# Patient Record
Sex: FEMALE | Race: WHITE | Employment: FULL TIME | URBAN - METROPOLITAN AREA
[De-identification: names, ages, dates, MRNs, and addresses within clinical notes are randomized per-mention and may not be internally consistent; named-entity substitution may affect disease eponyms.]

---

## 2017-09-06 ENCOUNTER — TELEPHONE (OUTPATIENT)
Dept: INTERNAL MEDICINE CLINIC | Age: 47
End: 2017-09-06

## 2017-09-06 RX ORDER — ESCITALOPRAM OXALATE 5 MG/1
5 TABLET ORAL DAILY
Qty: 30 TAB | Refills: 6 | Status: SHIPPED | OUTPATIENT
Start: 2017-09-06 | End: 2017-11-28 | Stop reason: DRUGHIGH

## 2017-09-06 NOTE — TELEPHONE ENCOUNTER
Trellradha Alka called the office stating [facility] requested a refill of her Lexapro last week, but they have not received confirmation on this. MsSilviano Darrall Kocher would like to be notified when this is called in to her pharmacy. Best number to reach her is [phone].     Recent Visit:       07/06/17  Upcoming Visit:  None scheduled

## 2017-09-07 ENCOUNTER — TELEPHONE (OUTPATIENT)
Dept: INTERNAL MEDICINE CLINIC | Age: 47
End: 2017-09-07

## 2017-09-07 RX ORDER — BUPROPION HYDROCHLORIDE 150 MG/1
150 TABLET, EXTENDED RELEASE ORAL 2 TIMES DAILY
Qty: 60 TAB | Refills: 0 | Status: SHIPPED | OUTPATIENT
Start: 2017-09-07 | End: 2017-09-07 | Stop reason: SDUPTHER

## 2017-09-07 RX ORDER — BUPROPION HYDROCHLORIDE 150 MG/1
150 TABLET, EXTENDED RELEASE ORAL 2 TIMES DAILY
Qty: 180 TAB | Refills: 3 | Status: SHIPPED | OUTPATIENT
Start: 2017-09-07 | End: 2018-10-07 | Stop reason: SDUPTHER

## 2017-09-07 NOTE — TELEPHONE ENCOUNTER
Lane Santiago is requesting a refill of her Wellbutrin 150, 2 x daily, 90 days. Dr. Javy Singh increased this medication from 100 2 x daily. She is requesting a prescription be sent to [email or web address], and she is also requesting a prescription be sent to [facility] on [address] to get her through until her prescription from [address] comes in. Upcoming visit:  None scheduled  Recent visit:       07/06/17    She can be reached at [phone].

## 2017-10-05 ENCOUNTER — OFFICE VISIT (OUTPATIENT)
Dept: INTERNAL MEDICINE CLINIC | Age: 47
End: 2017-10-05

## 2017-10-05 VITALS
OXYGEN SATURATION: 98 % | SYSTOLIC BLOOD PRESSURE: 132 MMHG | DIASTOLIC BLOOD PRESSURE: 90 MMHG | HEART RATE: 84 BPM | TEMPERATURE: 98.2 F | WEIGHT: 236 LBS

## 2017-10-05 DIAGNOSIS — J45.20 MILD INTERMITTENT ASTHMATIC BRONCHITIS WITHOUT COMPLICATION: ICD-10-CM

## 2017-10-05 DIAGNOSIS — R05.8 COUGH WITH EXPECTORATION: Primary | ICD-10-CM

## 2017-10-05 PROBLEM — M79.672 BILATERAL FOOT PAIN: Status: ACTIVE | Noted: 2017-10-05

## 2017-10-05 PROBLEM — J30.9 ALLERGIC RHINITIS: Status: ACTIVE | Noted: 2017-10-05

## 2017-10-05 PROBLEM — E66.01 OBESITIES, MORBID (HCC): Status: ACTIVE | Noted: 2017-10-05

## 2017-10-05 PROBLEM — M25.562 LEFT KNEE PAIN: Status: ACTIVE | Noted: 2017-10-05

## 2017-10-05 PROBLEM — I73.00 RAYNAUD'S DISEASE: Status: ACTIVE | Noted: 2017-10-05

## 2017-10-05 PROBLEM — Z13.1 SCREENING FOR DIABETES MELLITUS: Status: ACTIVE | Noted: 2017-10-05

## 2017-10-05 PROBLEM — M17.9 DJD (DEGENERATIVE JOINT DISEASE) OF KNEE: Status: ACTIVE | Noted: 2017-10-05

## 2017-10-05 PROBLEM — E55.9 VITAMIN D DEFICIENCY: Status: ACTIVE | Noted: 2017-10-05

## 2017-10-05 PROBLEM — F32.A DEPRESSION: Status: ACTIVE | Noted: 2017-10-05

## 2017-10-05 PROBLEM — Z13.220 SCREENING FOR HYPERLIPIDEMIA: Status: ACTIVE | Noted: 2017-10-05

## 2017-10-05 PROBLEM — M79.671 BILATERAL FOOT PAIN: Status: ACTIVE | Noted: 2017-10-05

## 2017-10-05 PROBLEM — M72.2 PLANTAR FASCIITIS, BILATERAL: Status: ACTIVE | Noted: 2017-10-05

## 2017-10-05 PROBLEM — Z13.29 SCREENING FOR HYPOTHYROIDISM: Status: ACTIVE | Noted: 2017-10-05

## 2017-10-05 PROBLEM — Z00.00 ANNUAL PHYSICAL EXAM: Status: ACTIVE | Noted: 2017-10-05

## 2017-10-05 RX ORDER — FEXOFENADINE HCL AND PSEUDOEPHEDRINE HCI 180; 240 MG/1; MG/1
1 TABLET, EXTENDED RELEASE ORAL DAILY
COMMUNITY
End: 2018-10-18 | Stop reason: ALTCHOICE

## 2017-10-05 RX ORDER — BISMUTH SUBSALICYLATE 262 MG
1 TABLET,CHEWABLE ORAL DAILY
COMMUNITY
End: 2022-03-15

## 2017-10-05 RX ORDER — METHYLPREDNISOLONE 4 MG/1
4 TABLET ORAL
Qty: 1 DOSE PACK | Refills: 0 | Status: SHIPPED | OUTPATIENT
Start: 2017-10-05 | End: 2017-11-28 | Stop reason: SDUPTHER

## 2017-10-05 RX ORDER — LORATADINE 10 MG/1
10 TABLET ORAL
COMMUNITY
End: 2020-02-19 | Stop reason: ALTCHOICE

## 2017-10-05 RX ORDER — SERTRALINE HYDROCHLORIDE 25 MG/1
TABLET, FILM COATED ORAL DAILY
COMMUNITY
End: 2018-10-18 | Stop reason: ALTCHOICE

## 2017-10-05 RX ORDER — CEFUROXIME AXETIL 500 MG/1
500 TABLET ORAL 2 TIMES DAILY
Qty: 20 TAB | Refills: 0 | Status: SHIPPED | OUTPATIENT
Start: 2017-10-05 | End: 2017-11-28 | Stop reason: SDUPTHER

## 2017-10-05 RX ORDER — FLUTICASONE PROPIONATE 50 MCG
2 SPRAY, SUSPENSION (ML) NASAL DAILY
COMMUNITY

## 2017-10-05 NOTE — PROGRESS NOTES
Subjective:  Ms. Honorio Bravo is a pleasant 56year old lady who comes in today with almost a three week history of a productive cough of yellowish sputum, some shortness of breath and wheezing. All of her difficulties started around 09/15/17. She was seen at [facility] and treated with a steroid pack and given a nebulizer, as well as some Ventolin, which seemed to help initially. It was felt that it was more of an allergic reaction than infection. She denies any past history of asthma, but she does have allergies to cats. However in the last week she has had increasing wheezing and is now coughing up yellowish sputum. She denies any chills or fever. She denies any hemoptysis. She denies any nausea or vomiting. She has been using the Ventolin inhaler three or four times a day. Physical Examination:  GENERAL:  On exam she is a pleasant lady in no acute distress. She is alert and oriented. VITALS:  BP: 132/90. P: 84.  T: 98.2. O2 sat: 98%. HEENT:  Normocephalic, atraumatic. TMs normal.  Mouth mucosa pink. Tongue midline. Pharynx minimally injected without presence of exudates. No sinus tenderness. NECK:  Supple without adenopathy. CHEST:  Lungs - occasional expiratory wheezes throughout both lung fields. No rales. Good chest excursion. CARDIAC:  Heart regular rhythm without murmur or gallop. EXTREMITIES:  No edema or calf tenderness. Distal pulses were present. Studies:  Two views of the chest failed to reveal any pneumonia. Impression:  1. Asthmatic bronchitis. Plan:  1. It was opted to start her on Ceftin 500 mg twice daily for ten days, along with a Medrol Dosepak. I also started her on Symbicort 160/4.5  two inhalations in the morning and two at bedtime. I instructed her on proper use of the inhaler. She may continue to use the Ventolin as needed. She will contact us should she fail to improve or if her condition worsens.   She is to increase fluids and rest.

## 2017-11-22 ENCOUNTER — TELEPHONE (OUTPATIENT)
Dept: INTERNAL MEDICINE CLINIC | Age: 47
End: 2017-11-22

## 2017-11-22 NOTE — TELEPHONE ENCOUNTER
Sofia López phoned the office stating that Dr. Janet Sherman increased her Lexapro from 5 mg to 10 mg and is needing a new prescription for the new strength. (Notified patient we would need office notes from Dr. Janet Sherman). Also, Dr. Janet Sherman gave her samples of Symbicort as she is sick again and coughing up phlegm. She is requesting a prescription for Symbicort as well. Please send both to [facility] on [address].     Upcoming visit:  None  Recent visit:       10/05/2017

## 2017-11-28 ENCOUNTER — OFFICE VISIT (OUTPATIENT)
Dept: INTERNAL MEDICINE CLINIC | Age: 47
End: 2017-11-28

## 2017-11-28 VITALS
DIASTOLIC BLOOD PRESSURE: 89 MMHG | WEIGHT: 248 LBS | OXYGEN SATURATION: 98 % | SYSTOLIC BLOOD PRESSURE: 137 MMHG | TEMPERATURE: 98.7 F | HEART RATE: 81 BPM

## 2017-11-28 DIAGNOSIS — F32.A DEPRESSION, UNSPECIFIED DEPRESSION TYPE: ICD-10-CM

## 2017-11-28 DIAGNOSIS — J45.20 MILD INTERMITTENT ASTHMATIC BRONCHITIS WITHOUT COMPLICATION: Primary | ICD-10-CM

## 2017-11-28 RX ORDER — CEFUROXIME AXETIL 500 MG/1
500 TABLET ORAL 2 TIMES DAILY
Qty: 20 TAB | Refills: 0 | Status: SHIPPED | OUTPATIENT
Start: 2017-11-28 | End: 2018-10-18 | Stop reason: ALTCHOICE

## 2017-11-28 RX ORDER — ESCITALOPRAM OXALATE 10 MG/1
10 TABLET ORAL DAILY
Qty: 90 TAB | Refills: 3 | Status: SHIPPED | OUTPATIENT
Start: 2017-11-28 | End: 2018-10-18 | Stop reason: SDUPTHER

## 2017-11-28 RX ORDER — METHYLPREDNISOLONE 4 MG/1
4 TABLET ORAL
Qty: 1 DOSE PACK | Refills: 0 | Status: SHIPPED | OUTPATIENT
Start: 2017-11-28 | End: 2018-10-18 | Stop reason: ALTCHOICE

## 2017-11-28 RX ORDER — BUDESONIDE AND FORMOTEROL FUMARATE DIHYDRATE 160; 4.5 UG/1; UG/1
2 AEROSOL RESPIRATORY (INHALATION) 2 TIMES DAILY
Qty: 1 INHALER | Refills: 12 | Status: SHIPPED | OUTPATIENT
Start: 2017-11-28 | End: 2018-10-18 | Stop reason: SDUPTHER

## 2017-11-28 NOTE — PROGRESS NOTES
Nela Del Valle presents with   Chief Complaint   Patient presents with    Cough    Ear Fullness    Chills    Sore Throat   Patient here with complaint of unresolved cough. Also now with ear fullness, sore throat & chills. Was last seen in early October & given Ceftin, Medrol dose margarita & Symbicort. States she felt somewhat better until she finished the Symbicort and symptoms returned. 1. Have you been to the ER, urgent care clinic since your last visit? Hospitalized since your last visit? No    2. Have you seen or consulted any other health care providers outside of the [address] [place] [address] since your last visit? Include any pap smears or colon screening.  No

## 2017-11-28 NOTE — PROGRESS NOTES
Subjective:  Ms. Barb Tobias is a pleasant 56year old lady, who comes in today for evaluation of productive cough of yellowish sputum associated with some wheezing. I saw her about six weeks ago, at which time I treated her for an asthmatic bronchitis with a course of Ceftin, Medrol Dosepak, and started her on Symbicort. She did extremely well on the Symbicort. She has been off of it now for two weeks and her symptoms have returned. She denies any shortness of breath or hemoptysis. She denies any chills or fever. She denies any nausea or vomiting. In addition she wanted to remind me that when she last saw Dr. Rancho Cox, her psychologist, her Lexapro was increased to 10 mg daily. This has helped her tremendously. She is in need of a new rx. Physical Examination:  GENERAL:  On exam she is a pleasant lady in no acute distress. She is alert and oriented. VITALS:  BP: 137/89. P: 81.  T: 98.7. O2 sat: 98%. HEENT:  Normocephalic, atraumatic. Left TM is minimally injected. Right TM is normal.  Mouth mucosa pink. Tongue midline. Pharynx normal. No sinus tenderness. NECK:  Supple without adenopathy. CHEST:  Lungs were clear to auscultation other than occasional expiratory wheeze. There are no rales. Good chest excursion. CARDIAC:  Heart regular rhythm without murmur. EXTREMITIES:  No edema or calf tenderness. Distal pulses were present. Impression:  1. Recurrent asthmatic bronchitis. Plan:  1. It was opted to start her on Ceftin 500 mg twice daily for seven days, along with a Medrol Dosepak. 2. I did restart her on Symbicort 160/4.5, two inhalations twice daily. We did discuss the fact that she should stay on it indefinitely. 3. In addition she may continue with Flonase alternating with Zyrtec or Claritin. 4. She is to increase fluids and rest.  5. She will contact us should she fail to improve or if her condition worsens.   6. I did send Lexapro 10 mg, (#90) and three refills to [facility].

## 2017-11-28 NOTE — MR AVS SNAPSHOT
Visit Information Date & Time Provider Department Dept. Phone Encounter #  
 11/28/2017 11:00 AM Joel Thomas NP [address] [place] [address] [facility] [phone] [id number] Follow-up Instructions Return if symptoms worsen or fail to improve. Upcoming Health Maintenance Date Due Pneumococcal 19-64 Medium Risk (1 of 1 - PPSV23) 6/4/1990 DTaP/Tdap/Td series (1 - Tdap) 6/4/1992 PAP AKA CERVICAL CYTOLOGY 6/4/1992 Influenza Age 5 to Adult 8/1/2017 Allergies as of 11/28/2017  Review Complete On: 11/28/2017 By: Joel Thomas NP No Known Allergies Current Immunizations  Never Reviewed No immunizations on file. Not reviewed this visit You Were Diagnosed With   
  
 Codes Comments Mild intermittent asthmatic bronchitis without complication    -  Primary ICD-10-CM: J45.20 ICD-9-CM: 493.90 Depression, unspecified depression type     ICD-10-CM: F32.9 ICD-9-CM: 666 Vitals BP Pulse Temp Weight(growth percentile) SpO2 Smoking Status 137/89 (BP 1 Location: Left arm, BP Patient Position: Sitting) 81 98.7 °F (37.1 °C) (Oral) 248 lb (112.5 kg) 98% Never Smoker Preferred Pharmacy Pharmacy Name Phone [address] [facility] [phone] Your Updated Medication List  
  
   
This list is accurate as of: 11/28/17 11:48 AM.  Always use your most recent med list. ALLEGRA-D 24 HOUR 180-240 mg per tablet Generic drug:  fexofenadine-pseudoephedrine Take 1 Tab by mouth daily. budesonide-formoterol 160-4.5 mcg/actuation Hfaa Commonly known as:  SYMBICORT Take 2 Puffs by inhalation two (2) times a day. buPROPion  mg SR tablet Commonly known as:  Fontana Civil Take 1 Tab by mouth two (2) times a day. cefUROXime 500 mg tablet Commonly known as:  CEFTIN Take 1 Tab by mouth two (2) times a day. CLARITIN 10 mg tablet Generic drug:  loratadine Take 10 mg by mouth.  
  
 escitalopram oxalate 10 mg tablet Commonly known as:  Zenaida Winslow Take 1 Tab by mouth daily. FLONASE 50 mcg/actuation nasal spray Generic drug:  fluticasone 2 Sprays by Both Nostrils route daily. methylPREDNISolone 4 mg tablet Commonly known as:  Karle Silverdale Take 1 Tab by mouth Specific Days and Specific Times. multivitamin tablet Commonly known as:  ONE A DAY Take 1 Tab by mouth daily. sertraline 25 mg tablet Commonly known as:  ZOLOFT Take  by mouth daily. Prescriptions Sent to Pharmacy Refills  
 cefUROXime (CEFTIN) 500 mg tablet 0 Sig: Take 1 Tab by mouth two (2) times a day. Class: Normal  
 Pharmacy: [address] Ph #: [phone] Route: Oral  
 methylPREDNISolone (MEDROL DOSEPACK) 4 mg tablet 0 Sig: Take 1 Tab by mouth Specific Days and Specific Times. Class: Normal  
 Pharmacy: [address] Ph #: [phone] Route: Oral  
 budesonide-formoterol (SYMBICORT) 160-4.5 mcg/actuation HFAA 12 Sig: Take 2 Puffs by inhalation two (2) times a day. Class: Normal  
 Pharmacy: [address] Ph #: [phone] Route: Inhalation  
 escitalopram oxalate (LEXAPRO) 10 mg tablet 3 Sig: Take 1 Tab by mouth daily. Class: Normal  
 Pharmacy: [address], [address] [place] [address] Ph #: [phone] Route: Oral  
  
Follow-up Instructions Return if symptoms worsen or fail to improve. Patient Instructions Asthma in Adults: Care Instructions Your Care Instructions During an asthma attack, your airways swell and narrow as a reaction to certain things (triggers). This makes it hard to breathe. You may be able to prevent asthma attacks if you avoid the things that set off your asthma symptoms. Keeping your asthma under control and treating symptoms before they get bad can help you avoid severe attacks. If you can control your asthma, you may be able to do all of your normal daily activities. You may also avoid asthma attacks and trips to the hospital. 
Follow-up care is a key part of your treatment and safety. Be sure to make and go to all appointments, and call your doctor if you are having problems. It's also a good idea to know your test results and keep a list of the medicines you take. How can you care for yourself at home? · Follow your asthma action plan so you can manage your symptoms at home. An asthma action plan will help you prevent and control airway reactions and will tell you what to do during an asthma attack. If you do not have an asthma action plan, work with your doctor to build one. · Take your asthma medicine exactly as prescribed. Medicine plays an important role in controlling asthma. Talk to your doctor right away if you have any questions about what to take and how to take it. ¨ Use your quick-relief medicine when you have symptoms of an attack. Quick-relief medicine often is an albuterol inhaler. Some people need to use quick-relief medicine before they exercise. ¨ Take your controller medicine every day, not just when you have symptoms. Controller medicine is usually an inhaled corticosteroid. The goal is to prevent problems before they occur. Do not use your controller medicine to try to treat an attack that has already started. It does not work fast enough to help. ¨ If your doctor prescribed corticosteroid pills to use during an attack, take them as directed.  They may take hours to work, but they may shorten the attack and help you breathe better. ¨ Keep your quick-relief medicine with you at all times. · Talk to your doctor before using other medicines. Some medicines, such as aspirin, can cause asthma attacks in some people. · Check yourself for asthma symptoms to know which step to follow in your action plan. Watch for things like being short of breath, having chest tightness, coughing, and wheezing. Also notice if symptoms wake you up at night or if you get tired quickly when you exercise. · If you have a peak flow meter, use it to check how well you are breathing. This can help you predict when an asthma attack is going to occur. Then you can take medicine to prevent the asthma attack or make it less severe. · See your doctor regularly. These visits will help you learn more about asthma and what you can do to control it. Your doctor will monitor your treatment to make sure the medicine is helping you. · Keep track of your asthma attacks and your treatment. After you have had an attack, write down what triggered it, what helped end it, and any concerns you have about your asthma action plan. Take your diary when you see your doctor. You can then review your asthma action plan and decide if it is working. · Do not smoke or allow others to smoke around you. Avoid smoky places. Smoking makes asthma worse. If you need help quitting, talk to your doctor about stop-smoking programs and medicines. These can increase your chances of quitting for good. · Learn what triggers an asthma attack for you, and avoid the triggers when you can. Common triggers include colds, smoke, air pollution, dust, pollen, mold, pets, cockroaches, stress, and cold air. · Avoid colds and the flu. Get a pneumococcal vaccine shot. If you have had one before, ask your doctor whether you need a second dose. Get a flu vaccine every fall. If you must be around people with colds or the flu, wash your hands often. When should you call for help? Call 911 anytime you think you may need emergency care. For example, call if: 
? · You have severe trouble breathing. ?Call your doctor now or seek immediate medical care if: 
? · Your symptoms do not get better after you have followed your asthma action plan. ? · You cough up yellow, dark brown, or bloody mucus (sputum). ? Watch closely for changes in your health, and be sure to contact your doctor if: 
? · Your coughing and wheezing get worse. ? · You need to use quick-relief medicine on more than 2 days a week (unless it is just for exercise). ? · You need help figuring out what is triggering your asthma attacks. Where can you learn more? Go to http://gaetano-delon.info/. Enter P597 in the search box to learn more about \"Asthma in Adults: Care Instructions. \" Current as of: May 12, 2017 Content Version: 11.4 © 3568-6852 [employer]. Care instructions adapted under license by [employer] (which disclaims liability or warranty for this information). If you have questions about a medical condition or this instruction, always ask your healthcare professional. [place] [address] any warranty or liability for your use of this information. Introducing [facility]! [address] introduces quietrevolution patient portal. Now you can access parts of your medical record, email your doctor's office, and request medication refills online. 1. In your internet browser, go to [email or web address] 2. Click on the First Time User? Click Here link in the Sign In box. You will see the New Member Sign Up page. 3. Enter your quietrevolution Access Code exactly as it appears below. You will not need to use this code after youve completed the sign-up process. If you do not sign up before the expiration date, you must request a new code. · quietrevolution Access Code: [email or web address] Expires: 1/3/2018  1:05 PM 
 
 4. Enter the last four digits of your Social Security Number (xxxx) and Date of Birth (mm/dd/yyyy) as indicated and click Submit. You will be taken to the next sign-up page. 5. Create a Boomi ID. This will be your Boomi login ID and cannot be changed, so think of one that is secure and easy to remember. 6. Create a Boomi password. You can change your password at any time. 7. Enter your Password Reset Question and Answer. This can be used at a later time if you forget your password. 8. Enter your e-mail address. You will receive e-mail notification when new information is available in [address] 9. Click Sign Up. You can now view and download portions of your medical record. 10. Click the Download Summary menu link to download a portable copy of your medical information. If you have questions, please visit the Frequently Asked Questions section of the Boomi website. Remember, Boomi is NOT to be used for urgent needs. For medical emergencies, dial 911. Now available from your iPhone and Android! Please provide this summary of care documentation to your next provider. Your primary care clinician is listed as Mendoza Barnes. If you have any questions after today's visit, please call [phone].

## 2017-11-28 NOTE — PATIENT INSTRUCTIONS

## 2017-11-29 NOTE — TELEPHONE ENCOUNTER
Symbicort 160-4.5 mcg to take 2 puffs 2 x daily was called in to [facility] for [facility] on 11/28/2017.   Receipt confirmed by pharmacy 11/28/2017 at 11:39 AM.

## 2018-10-18 ENCOUNTER — OFFICE VISIT (OUTPATIENT)
Dept: INTERNAL MEDICINE CLINIC | Age: 48
End: 2018-10-18

## 2018-10-18 VITALS
SYSTOLIC BLOOD PRESSURE: 123 MMHG | OXYGEN SATURATION: 98 % | DIASTOLIC BLOOD PRESSURE: 89 MMHG | HEIGHT: 69 IN | HEART RATE: 83 BPM | BODY MASS INDEX: 40.58 KG/M2 | WEIGHT: 274 LBS

## 2018-10-18 DIAGNOSIS — E66.01 CLASS 3 SEVERE OBESITY DUE TO EXCESS CALORIES WITHOUT SERIOUS COMORBIDITY WITH BODY MASS INDEX (BMI) OF 40.0 TO 44.9 IN ADULT (HCC): ICD-10-CM

## 2018-10-18 DIAGNOSIS — F32.A DEPRESSION, UNSPECIFIED DEPRESSION TYPE: Primary | ICD-10-CM

## 2018-10-18 RX ORDER — ESCITALOPRAM OXALATE 10 MG/1
10 TABLET ORAL DAILY
Qty: 90 TAB | Refills: 3 | Status: SHIPPED | OUTPATIENT
Start: 2018-10-18 | End: 2019-12-10 | Stop reason: SDUPTHER

## 2018-10-18 RX ORDER — BUPROPION HYDROCHLORIDE 150 MG/1
150 TABLET, EXTENDED RELEASE ORAL 2 TIMES DAILY
Qty: 180 TAB | Refills: 2 | Status: SHIPPED | OUTPATIENT
Start: 2018-10-18 | End: 2019-09-01 | Stop reason: SDUPTHER

## 2018-10-18 RX ORDER — BUDESONIDE AND FORMOTEROL FUMARATE DIHYDRATE 160; 4.5 UG/1; UG/1
2 AEROSOL RESPIRATORY (INHALATION) 2 TIMES DAILY
Qty: 1 INHALER | Refills: 12 | Status: SHIPPED | OUTPATIENT
Start: 2018-10-18 | End: 2019-01-31 | Stop reason: SDUPTHER

## 2018-10-18 RX ORDER — DROSPIRENONE AND ETHINYL ESTRADIOL 0.03MG-3MG
KIT ORAL DAILY
COMMUNITY
End: 2022-03-15

## 2018-10-18 NOTE — PROGRESS NOTES
Subjective:  Ms. Jannet Hargrove is a pleasant 53year old lady per my request.  She was last seen here in November of 2017. She recently called for a refill on her medication and I did ask her to come in to be seen. She really does not have any complaints. She is currently being treated for chronic depression. She is in counseling and is doing very well. She does not have any new complaints today. Specifically she denies headaches, dizziness or blurred vision. Denies chest pain or palpitations. Denies shortness of breath, cough, wheezing, PND or orthopnea. Denies ankle edema. Physical Examination:  GENERAL:  Pleasant lady in no acute distress. She is alert and oriented. VITALS:  BP: 123/89. P: 83.  O2 sat: 98.  WT: 274, which is up since her last visit. HEENT:  Normocephalic, atraumatic. NECK:  Supple without adenopathy. CHEST:  Lungs clear to auscultation, no rales or wheezes. CV:  Heart regular rhythm without murmur. EXTREMITIES:  No edema or calf tenderness. Distal pulses were present. Impression:  1. Chronic depression. 2. Marked obesity. Plan:  1. She will continue with her current regimen and I will have her come in in the next few weeks for an updated H&P and fasting lab studies. She is in agreement.

## 2018-10-18 NOTE — PROGRESS NOTES
Addendum:  While here today I did talk to Ms. Johnson Speaker about weight, which has gone up in the last year. We discussed the importance of a balanced diet, exercise and weight loss to keep her BMI at an acceptable level.

## 2018-10-18 NOTE — PROGRESS NOTES
Past Medical History:   Diagnosis Date    Allergic rhinitis 10/5/2017    Annual physical exam 10/5/2017    Bilateral foot pain 10/5/2017    Depression 10/5/2017    DJD (degenerative joint disease) of knee 10/5/2017    Left knee pain 10/5/2017    Obesities, morbid ([address] 10/5/2017    Plantar fasciitis, bilateral 10/5/2017    Raynaud's disease 10/5/2017    Screening for diabetes mellitus 10/5/2017    Screening for hyperlipidemia 10/5/2017    Screening for hypothyroidism 10/5/2017    Vitamin D deficiency 10/5/2017     History reviewed. No pertinent surgical history. Current Outpatient Medications on File Prior to Visit   Medication Sig Dispense Refill    drospirenone-ethinyl estradiol (BRI, 28,) 3-0.03 mg tab Take  by mouth daily.  multivitamin (ONE A DAY) tablet Take 1 Tab by mouth daily.  fluticasone (FLONASE) 50 mcg/actuation nasal spray 2 Sprays by Both Nostrils route daily.  loratadine (CLARITIN) 10 mg tablet Take 10 mg by mouth. No current facility-administered medications on file prior to visit.       No Known Allergies

## 2018-10-18 NOTE — PROGRESS NOTES
Lyndsey Adams presents with   Chief Complaint   Patient presents with    Medication Refill    Follow-up   Patient here for an overdue followup and refills on all of her medications. Last seen 11/2017. Weight is up 26lbs. 1. Have you been to the ER, urgent care clinic since your last visit? Hospitalized since your last visit? No    2. Have you seen or consulted any other health care providers outside of the [address] [place] [address] since your last visit? Include any pap smears or colon screening.  No

## 2019-01-31 ENCOUNTER — TELEPHONE (OUTPATIENT)
Dept: INTERNAL MEDICINE CLINIC | Age: 49
End: 2019-01-31

## 2019-01-31 RX ORDER — BUDESONIDE AND FORMOTEROL FUMARATE DIHYDRATE 160; 4.5 UG/1; UG/1
2 AEROSOL RESPIRATORY (INHALATION) 2 TIMES DAILY
Qty: 3 INHALER | Refills: 3 | Status: SHIPPED | OUTPATIENT
Start: 2019-01-31 | End: 2020-02-03 | Stop reason: SDUPTHER

## 2019-01-31 NOTE — TELEPHONE ENCOUNTER
Patient called requesting a new prescription for   Symbicort 160-4.5 mcg, 2 puffs 2x daily, [place] [address] [place]. She is unable to use the discount card thru [facility].

## 2019-02-14 ENCOUNTER — OFFICE VISIT (OUTPATIENT)
Dept: INTERNAL MEDICINE CLINIC | Age: 49
End: 2019-02-14

## 2019-02-14 VITALS
OXYGEN SATURATION: 96 % | HEART RATE: 80 BPM | TEMPERATURE: 98.2 F | DIASTOLIC BLOOD PRESSURE: 85 MMHG | SYSTOLIC BLOOD PRESSURE: 125 MMHG | WEIGHT: 269 LBS | HEIGHT: 68 IN | BODY MASS INDEX: 40.77 KG/M2

## 2019-02-14 DIAGNOSIS — M54.41 CHRONIC BILATERAL LOW BACK PAIN WITH BILATERAL SCIATICA: ICD-10-CM

## 2019-02-14 DIAGNOSIS — Z00.00 ANNUAL PHYSICAL EXAM: Primary | ICD-10-CM

## 2019-02-14 DIAGNOSIS — E66.01 OBESITIES, MORBID (HCC): ICD-10-CM

## 2019-02-14 DIAGNOSIS — E55.9 VITAMIN D DEFICIENCY: ICD-10-CM

## 2019-02-14 DIAGNOSIS — M54.42 CHRONIC BILATERAL LOW BACK PAIN WITH BILATERAL SCIATICA: ICD-10-CM

## 2019-02-14 DIAGNOSIS — Z13.1 SCREENING FOR DIABETES MELLITUS: ICD-10-CM

## 2019-02-14 DIAGNOSIS — G89.29 CHRONIC BILATERAL LOW BACK PAIN WITH BILATERAL SCIATICA: ICD-10-CM

## 2019-02-14 DIAGNOSIS — Z13.29 SCREENING FOR HYPOTHYROIDISM: ICD-10-CM

## 2019-02-14 DIAGNOSIS — Z13.220 SCREENING FOR HYPERLIPIDEMIA: ICD-10-CM

## 2019-02-14 LAB
BILIRUB UR QL: NEGATIVE
CLARITY: CLEAR
COLOR UR: ABNORMAL
ERYTHROCYTE [DISTWIDTH] IN BLOOD BY AUTOMATED COUNT: 15.2 %
GLUCOSE 24H UR-MRATE: NEGATIVE G/(24.H)
HCT VFR BLD AUTO: 41.8 % (ref 37–51)
HGB BLD-MCNC: 13.3 G/DL (ref 12–18)
HGB UR QL STRIP: NEGATIVE
KETONES UR QL STRIP.AUTO: ABNORMAL
LEUKOCYTE ESTERASE: NEGATIVE
LYMPHOCYTES ABSOLUTE: 2.5 K/UL (ref 0.6–4.1)
LYMPHOCYTES NFR BLD: 28.2 % (ref 10–58.5)
MCH RBC QN AUTO: 28.1 PG (ref 26–32)
MCHC RBC AUTO-ENTMCNC: 31.8 G/DL (ref 30–36)
MCV RBC AUTO: 88.1 FL (ref 80–97)
MONOCYTES ABS-DIF,2141: 0.8 K/UL (ref 0–1.8)
MONOCYTES NFR BLD: 9.3 % (ref 0.1–24)
NEUTROPHILS # BLD: 62.5 % (ref 37–92)
NEUTROPHILS ABS,2156: 5.5 K/UL (ref 2–7.8)
NITRITE UR QL STRIP.AUTO: NEGATIVE
PH UR STRIP: 7 [PH] (ref 5–7)
PLATELET # BLD AUTO: 286 K/UL (ref 140–440)
PMV BLD AUTO: 9.2 FL
PROT UR STRIP-MCNC: NEGATIVE MG/DL
RBC # BLD AUTO: 4.74 M/UL (ref 4.2–6.3)
SP GR UR REFRACTOMETRY: 1.01 (ref 1–1.03)
UROBILINOGEN UR QL STRIP.AUTO: NEGATIVE
WBC # BLD AUTO: 8.8 K/UL (ref 4.1–10.9)

## 2019-02-14 RX ORDER — PREDNISONE 10 MG/1
TABLET ORAL
Qty: 21 TAB | Refills: 0 | Status: SHIPPED | OUTPATIENT
Start: 2019-02-14 | End: 2020-02-19 | Stop reason: ALTCHOICE

## 2019-02-14 NOTE — PATIENT INSTRUCTIONS
Body Mass Index: Care Instructions  Your Care Instructions    Body mass index (BMI) can help you see if your weight is raising your risk for health problems. It uses a formula to compare how much you weigh with how tall you are. · A BMI lower than 18.5 is considered underweight. · A BMI between 18.5 and 24.9 is considered healthy. · A BMI between 25 and 29.9 is considered overweight. A BMI of 30 or higher is considered obese. If your BMI is in the normal range, it means that you have a lower risk for weight-related health problems. If your BMI is in the overweight or obese range, you may be at increased risk for weight-related health problems, such as high blood pressure, heart disease, stroke, arthritis or joint pain, and diabetes. If your BMI is in the underweight range, you may be at increased risk for health problems such as fatigue, lower protection (immunity) against illness, muscle loss, bone loss, hair loss, and hormone problems. BMI is just one measure of your risk for weight-related health problems. You may be at higher risk for health problems if you are not active, you eat an unhealthy diet, or you drink too much alcohol or use tobacco products. Follow-up care is a key part of your treatment and safety. Be sure to make and go to all appointments, and call your doctor if you are having problems. It's also a good idea to know your test results and keep a list of the medicines you take. How can you care for yourself at home? · Practice healthy eating habits. This includes eating plenty of fruits, vegetables, whole grains, lean protein, and low-fat dairy. · If your doctor recommends it, get more exercise. Walking is a good choice. Bit by bit, increase the amount you walk every day. Try for at least 30 minutes on most days of the week. · Do not smoke. Smoking can increase your risk for health problems. If you need help quitting, talk to your doctor about stop-smoking programs and medicines. These can increase your chances of quitting for good. · Limit alcohol to 2 drinks a day for men and 1 drink a day for women. Too much alcohol can cause health problems. If you have a BMI higher than 25  · Your doctor may do other tests to check your risk for weight-related health problems. This may include measuring the distance around your waist. A waist measurement of more than 40 inches in men or 35 inches in women can increase the risk of weight-related health problems. · Talk with your doctor about steps you can take to stay healthy or improve your health. You may need to make lifestyle changes to lose weight and stay healthy, such as changing your diet and getting regular exercise. If you have a BMI lower than 18.5  · Your doctor may do other tests to check your risk for health problems. · Talk with your doctor about steps you can take to stay healthy or improve your health. You may need to make lifestyle changes to gain or maintain weight and stay healthy, such as getting more healthy foods in your diet and doing exercises to build muscle. Where can you learn more? Go to http://gaetano-delon.info/. Enter S176 in the search box to learn more about \"Body Mass Index: Care Instructions. \"  Current as of: June 25, 2018  Content Version: 11.9  © 3194-9324 [employer]lick[employer]. Care instructions adapted under license by [employer] (which disclaims liability or warranty for this information). If you have questions about a medical condition or this instruction, always ask your healthcare professional. [address] any warranty or liability for your use of this information.

## 2019-02-14 NOTE — PROGRESS NOTES
Viridiana Couch presents with   Chief Complaint   Patient presents with    Complete Physical   Patient here for a CPE. She is fasting. NKDA. Medications reviewed & updated. Medical, surgical, social & family history reviewed & updated. Has Mammogram & Pap/pelvis at [facility]. Colonoscopy done per [facility]. 1. Have you been to the ER, urgent care clinic since your last visit? Hospitalized since your last visit? No    2. Have you seen or consulted any other health care providers outside of the [address] [place] [address] since your last visit? Include any pap smears or colon screening.  No

## 2019-02-14 NOTE — PROGRESS NOTES
Subjective:  Ms. Lawrence Hopkins is a pleasant 53year old lady who comes in today for an updated history and physical.    History of Present Illness:  She does give a several month history of low back pain that radiates down both legs. She denies any injury and is not aware of any precipitating factors. She feels like her muscles are getting tight. She denies any numbness or tingling down the lower extremities. She denies any bowel or bladder difficulty. She has been helped by doing some stretches. Denies any previous history of back injury. Past Medical History:   Diagnosis Date    Allergic rhinitis 10/5/2017    Annual physical exam 10/5/2017    Bilateral foot pain 10/5/2017    Depression 10/5/2017    DJD (degenerative joint disease) of knee 10/5/2017    Left knee pain 10/5/2017    Obesities, morbid ([address] 10/5/2017    Plantar fasciitis, bilateral 10/5/2017    Raynaud's disease 10/5/2017    Screening for diabetes mellitus 10/5/2017    Screening for hyperlipidemia 10/5/2017    Screening for hypothyroidism 10/5/2017    Vitamin D deficiency 10/5/2017     Past Surgical History:   Procedure Laterality Date    HX WISDOM TEETH EXTRACTION      HX WRIST FRACTURE [place]  2005       Current Outpatient Medications on File Prior to Visit   Medication Sig Dispense Refill    budesonide-formoterol (SYMBICORT) 160-4.5 mcg/actuation HFAA Take 2 Puffs by inhalation two (2) times a day. 3 Inhaler 3    drospirenone-ethinyl estradiol (BRI, 28,) 3-0.03 mg tab Take  by mouth daily.  buPROPion SR (WELLBUTRIN SR) 150 mg SR tablet Take 1 Tab by mouth two (2) times a day. 180 Tab 2    escitalopram oxalate (LEXAPRO) 10 mg tablet Take 1 Tab by mouth daily. 90 Tab 3    multivitamin (ONE A DAY) tablet Take 1 Tab by mouth daily.  fluticasone (FLONASE) 50 mcg/actuation nasal spray 2 Sprays by Both Nostrils route daily.  loratadine (CLARITIN) 10 mg tablet Take 10 mg by mouth.        No current facility-administered medications on file prior to visit. Not on File  Past Medical History/Surgeries:    1. ORIF of right hand fracture. 2. LASIK surgery. Illnesses:  1. Chronic depression. 2. Allergic rhinitis. 3. Obesity. Family History:  Father committed suicide at age 67. Mother is 67years of age, alive with hypertension. She has one sister who has had cervical cancer. Social History:  She is . She works for a nonprofit organization. She has four children living and well. Allergies:  None. Medications:  As per [employer]. Habits:  Nonsmoker and non drinker. Review of Systems:  HEENT:  Denies any headaches, dizziness or blurred vision. She does wear glasses. She did have an eye exam done last week, which was normal.  CVR:  Denies any chest pain or palpitations. Denies any syncopal episode, shortness of breath, cough, wheezing, PND or orthopnea. Denies any ankle edema. GI:  Appetite is good, weight has gone back up in the last year. Does have a normal bowel pattern without presence of blood in stools or melena. She did have a colonoscopy two years ago, tells me that she was found to have benign polyps. She is unsure when she needs to have a repeat colonoscopy. :  Denies any urinary symptoms. GYN:  She did have a normal pelvic and pap and mammogram in October, 2018 through the [facility] in [place]. Physical Examination:  GENERAL:  Pleasant lady in no acute distress. She is alert and oriented. She answers my questions appropriately. VITALS:  BP: 125/85. P: 80.  O2 sat: 96.  T: 98.2. WT: 269 lbs. HEENT:  Normocephalic, atraumatic. PERRLA, EOMI. TMs normal.  Mouth mucosa pink. Tongue midline. Pharynx normal.  Teeth are in good repair. NECK:  Supple without adenopathy, thyromegaly or carotid bruits. CHEST:  Lungs clear to auscultation, no rales or wheezes. CV:  Heart regular rhythm without murmur or gallop.   ABDOMEN:  Obese, soft, non tender, no appreciable organomegaly or masses. No lymphadenopathy. EXTREMITIES:  No edema or calf tenderness. Distal pulses were present and symmetrical.  BACK:  No pain on percussion of lumbar spine. She does have pain on hyperextension and lateral bending. SLRL negative bilaterally. She has full ROM of her hips. Sensation is preserved. NEUROLOGIC:  Cranial nerves II-XII intact. Excellent strength in the upper and lower extremities against resistance. Romberg is negative. Reflexes 2+ and symmetrical.  She had excellent coordination. Studies: Three views of the lumbar spine reveal some facet arthropathy, multiple levels, as well as a very mild narrowing at L5-S1. Impression:  1. Low back pain. 2. Degenerative arthritis of lumbar spine. 3. Chronic depression. 4. Allergic rhinitis. 5. Obesity. Plan:  1. In terms of her back it was opted to try her on a Medrol Dosepak. She may use heat alternating with ice. She certainly will contact us should that fail to improve. I certainly could consider referring her to an orthopedic back specialist or physical therapy. 2. She was fasting this morning so it was opted to do all of her lab studies. I will call her as soon as I have her results. 3. We did talk about the importance of a prudent diet, exercise and weight loss to keep her BMI within acceptable level. 4. I will continue to see her yearly.

## 2019-02-15 LAB
A-G RATIO,AGRAT: 1.5 RATIO
ALBUMIN SERPL-MCNC: 4.8 G/DL (ref 3.9–5.4)
ALP SERPL-CCNC: 82 U/L (ref 38–126)
ALT SERPL-CCNC: 36 U/L (ref 9–52)
ANION GAP SERPL CALC-SCNC: 15 MMOL/L
AST SERPL W P-5'-P-CCNC: 25 U/L (ref 14–36)
BILIRUB SERPL-MCNC: 0.2 MG/DL (ref 0.2–1.3)
BUN SERPL-MCNC: 14 MG/DL (ref 7–17)
BUN/CREATININE RATIO,BUCR: 18 RATIO
CALCIUM SERPL-MCNC: 10.3 MG/DL (ref 8.4–10.2)
CHLORIDE SERPL-SCNC: 108 MMOL/L (ref 98–107)
CHOL/HDL RATIO,CHHD: 2 RATIO (ref 0–4)
CHOLEST SERPL-MCNC: 149 MG/DL (ref 0–200)
CO2 SERPL-SCNC: 22 MMOL/L (ref 22–32)
CREAT SERPL-MCNC: 0.8 MG/DL (ref 0.7–1.2)
GLOBULIN,GLOB: 3.3
GLUCOSE SERPL-MCNC: 95 MG/DL (ref 65–105)
HBA1C MFR BLD: 5.6 % (ref 4.8–5.6)
HDLC SERPL-MCNC: 64 MG/DL (ref 35–130)
LDL/HDL RATIO,LDHD: 1 RATIO
LDLC SERPL CALC-MCNC: 60 MG/DL (ref 0–130)
POTASSIUM SERPL-SCNC: 4.6 MMOL/L (ref 3.6–5)
PROT SERPL-MCNC: 8.1 G/DL (ref 6.3–8.2)
SODIUM SERPL-SCNC: 145 MMOL/L (ref 137–145)
TRIGL SERPL-MCNC: 124 MG/DL (ref 0–200)
VLDLC SERPL CALC-MCNC: 25 MG/DL

## 2019-02-18 LAB
25(OH)D3 SERPL-MCNC: 59 NG/ML (ref 30–96)
T4 FREE SERPL-MCNC: 1.02 NG/DL (ref 0.58–2.3)
TSH SERPL DL<=0.05 MIU/L-ACNC: 1.27 UIU/ML (ref 0.34–5.6)

## 2019-02-20 NOTE — PROGRESS NOTES
Lab results discussed with patient. Continue current regimen. Follow up one year. Back is also feeling better.

## 2019-09-02 RX ORDER — BUPROPION HYDROCHLORIDE 150 MG/1
TABLET, EXTENDED RELEASE ORAL
Qty: 180 TAB | Refills: 4 | Status: SHIPPED | OUTPATIENT
Start: 2019-09-02 | End: 2020-11-27

## 2019-09-24 PROBLEM — Z13.29 SCREENING FOR HYPOTHYROIDISM: Status: RESOLVED | Noted: 2017-10-05 | Resolved: 2019-09-24

## 2019-09-24 PROBLEM — Z13.220 SCREENING FOR HYPERLIPIDEMIA: Status: RESOLVED | Noted: 2017-10-05 | Resolved: 2019-09-24

## 2019-09-24 PROBLEM — Z00.00 ANNUAL PHYSICAL EXAM: Status: RESOLVED | Noted: 2017-10-05 | Resolved: 2019-09-24

## 2019-09-24 PROBLEM — Z13.1 SCREENING FOR DIABETES MELLITUS: Status: RESOLVED | Noted: 2017-10-05 | Resolved: 2019-09-24

## 2019-12-10 RX ORDER — ESCITALOPRAM OXALATE 10 MG/1
TABLET ORAL
Qty: 90 TAB | Refills: 4 | Status: SHIPPED | OUTPATIENT
Start: 2019-12-10 | End: 2021-02-26

## 2020-02-03 ENCOUNTER — OFFICE VISIT (OUTPATIENT)
Dept: INTERNAL MEDICINE CLINIC | Age: 50
End: 2020-02-03

## 2020-02-03 VITALS
WEIGHT: 272 LBS | HEART RATE: 91 BPM | SYSTOLIC BLOOD PRESSURE: 124 MMHG | HEIGHT: 68 IN | TEMPERATURE: 98.2 F | OXYGEN SATURATION: 97 % | RESPIRATION RATE: 18 BRPM | DIASTOLIC BLOOD PRESSURE: 86 MMHG | BODY MASS INDEX: 41.22 KG/M2

## 2020-02-03 DIAGNOSIS — F32.A DEPRESSION, UNSPECIFIED DEPRESSION TYPE: Primary | ICD-10-CM

## 2020-02-03 RX ORDER — BUDESONIDE AND FORMOTEROL FUMARATE DIHYDRATE 160; 4.5 UG/1; UG/1
2 AEROSOL RESPIRATORY (INHALATION) 2 TIMES DAILY
Qty: 1 INHALER | Refills: 12 | Status: SHIPPED | OUTPATIENT
Start: 2020-02-03 | End: 2020-09-16 | Stop reason: SDUPTHER

## 2020-02-03 NOTE — PATIENT INSTRUCTIONS
Depression and Chronic Disease: Care Instructions  Your Care Instructions    A chronic disease is one that you have for a long time. Some chronic diseases can be controlled, but they usually cannot be cured. Depression is common in people with chronic diseases, but it often goes unnoticed. Many people have concerns about seeking treatment for a mental health problem. You may think it's a sign of weakness, or you don't want people to know about it. It's important to overcome these reasons for not seeking treatment. Treating depression or anxiety is good for your health. Follow-up care is a key part of your treatment and safety. Be sure to make and go to all appointments, and call your doctor if you are having problems. It's also a good idea to know your test results and keep a list of the medicines you take. How can you care for yourself at home? Watch for symptoms of depression  The symptoms of depression are often subtle at first. You may think they are caused by your disease rather than depression. Or you may think it is normal to be depressed when you have a chronic disease. If you are depressed you may:  · Feel sad or hopeless. · Feel guilty or worthless. · Not enjoy the things you used to enjoy. · Feel hopeless, as though life is not worth living. · Have trouble thinking or remembering. · Have low energy, and you may not eat or sleep well. · Pull away from others. · Think often about death or killing yourself. (Keep the numbers for these national suicide hotlines: 6-196-514-TALK [1-829.135.7285] and 2-582-WTNNNWC [1-146.155.6508]. )  Get treatment  By treating your depression, you can feel more hopeful and have more energy. If you feel better, you may take better care of yourself, so your health may improve. · Talk to your doctor if you have any changes in mood during treatment for your disease. · Ask your doctor for help.  Counseling, antidepressant medicine, or a combination of the two can help most people with depression. Often a combination works best. Counseling can also help you cope with having a chronic disease. When should you call for help? Call 911 anytime you think you may need emergency care. For example, call if:    · You feel like hurting yourself or someone else.     · Someone you know has depression and is about to attempt or is attempting suicide.   [facility] your doctor now or seek immediate medical care if:    · You hear voices.     · Someone you know has depression and:  ? Starts to give away his or her possessions. ? Uses illegal drugs or drinks alcohol heavily. ? Talks or writes about death, including writing suicide notes or talking about guns, knives, or pills. ? Starts to spend a lot of time alone. ? Acts very aggressively or suddenly appears calm.    Watch closely for changes in your health, and be sure to contact your doctor if:    · You do not get better as expected. Where can you learn more? Go to http://gaetano-delon.info/. Enter J591 in the search box to learn more about \"Depression and Chronic Disease: Care Instructions. \"  Current as of: May 28, 2019  Content Version: 12.2  © 8670-0685 [employer]. Care instructions adapted under license by [employer] (which disclaims liability or warranty for this information). If you have questions about a medical condition or this instruction, always ask your healthcare professional. [address] any warranty or liability for your use of this information.

## 2020-02-03 NOTE — PROGRESS NOTES
Goldy Burroughs is a 51 y.o. female     Chief Complaint   Patient presents with    Medication Refill       Visit Vitals  /86 (BP 1 Location: Left arm, BP Patient Position: Sitting)   Pulse 91   Temp 98.2 °F (36.8 °C) (Oral)   Resp 18   Ht 5' 7.5\" (1.715 m)   Wt 272 lb (123.4 kg)   LMP 01/08/2020   SpO2 97%   BMI 41.97 kg/m²       Health Maintenance Due   Topic Date Due    DTaP/Tdap/Td series (1 - Tdap) 06/04/1982    Influenza Age 5 to Adult  08/01/2019    PAP AKA CERVICAL CYTOLOGY  08/05/2019       1. Have you been to the ER, urgent care clinic since your last visit? Hospitalized since your last visit? No    2. Have you seen or consulted any other health care providers outside of the [address] [place] [address] since your last visit? Include any pap smears or colon screening.  No

## 2020-02-04 NOTE — PROGRESS NOTES
Subjective:  Ms. Nanda Ocasio is a pleasant 51year old lady who comes in today after a one year absence. She comes in today to discuss her ongoing depression. In the past she has been managed on Lexapro 10 mg daily, along with Bupropion  mg twice daily. It appears that the week prior to her menstrual cycle she really gets depressed. She is now in counseling with [facility] and this has seemed to help, but does not prevent her from having these bouts of depression monthly. In the past I did refer her to neuropsychiatric counseling, however her psychiatric nurse practitioner has retired since then. She has seen two different psychiatrists, but felt that neither one was very helpful. She denies any suicidal thoughts. Past Medical History:   Diagnosis Date    Allergic rhinitis 10/5/2017    Annual physical exam 10/5/2017    Bilateral foot pain 10/5/2017    Depression 10/5/2017    DJD (degenerative joint disease) of knee 10/5/2017    Left knee pain 10/5/2017    Obesities, morbid ([address] 10/5/2017    Plantar fasciitis, bilateral 10/5/2017    Raynaud's disease 10/5/2017    Screening for diabetes mellitus 10/5/2017    Screening for hyperlipidemia 10/5/2017    Screening for hypothyroidism 10/5/2017    Vitamin D deficiency 10/5/2017     Past Surgical History:   Procedure Laterality Date    HX WISDOM TEETH EXTRACTION      HX WRIST FRACTURE [address]  2005       Current Outpatient Medications on File Prior to Visit   Medication Sig Dispense Refill    escitalopram oxalate (LEXAPRO) 10 mg tablet TAKE 1 TABLET DAILY 90 Tab 4    buPROPion SR (WELLBUTRIN SR) 150 mg SR tablet TAKE 1 TABLET TWICE A  Tab 4    drospirenone-ethinyl estradiol (BRI, 28,) 3-0.03 mg tab Take  by mouth daily.  multivitamin (ONE A DAY) tablet Take 1 Tab by mouth daily.  fluticasone (FLONASE) 50 mcg/actuation nasal spray 2 Sprays by Both Nostrils route daily.  loratadine (CLARITIN) 10 mg tablet Take 10 mg by mouth.  predniSONE (STERAPRED DS) 10 mg dose pack Take as directed on packet 21 Tab 0     No current facility-administered medications on file prior to visit. Not on File    Physical Examination:  GENERAL:  Pleasant lady in no acute distress. She is alert and oriented. She answers my questions appropriately. She has good eye contact. VITALS:  Blood pressure:  124/86. Pulse:  91. Temperature: 98.2. O2 sat:  97.  Weight:  272 pounds. HEENT:  Normocephalic, atraumatic. NECK:  Supple without adenopathy. CHEST:  Lungs clear to auscultation, no rales or wheezes. CV:  Heart regular rhythm without murmur or gallop. EXTREMITIES:  No edema or calf tenderness. Distal pulses were present. NEURO:  Exam is non focal.    Impression:  1. Chronic depression. Plan:  1. I am referring her back to Anuja Reed, psychiatric NP. She is encouraged to continue with her counseling through [address] [place] [address]. 2. I do want her to follow up in the next few weeks for an updated history and physical, as well as fasting lab studies, which she is fully agreeable. 3. We also discussed the importance of a prudent diet, exercise and weight loss to keep BMI within acceptable level.

## 2020-02-19 ENCOUNTER — OFFICE VISIT (OUTPATIENT)
Dept: INTERNAL MEDICINE CLINIC | Age: 50
End: 2020-02-19

## 2020-02-19 VITALS
OXYGEN SATURATION: 97 % | WEIGHT: 272.9 LBS | HEART RATE: 74 BPM | TEMPERATURE: 98.5 F | DIASTOLIC BLOOD PRESSURE: 82 MMHG | RESPIRATION RATE: 16 BRPM | HEIGHT: 68 IN | SYSTOLIC BLOOD PRESSURE: 126 MMHG | BODY MASS INDEX: 41.36 KG/M2

## 2020-02-19 DIAGNOSIS — E55.9 VITAMIN D DEFICIENCY: Primary | ICD-10-CM

## 2020-02-19 DIAGNOSIS — E66.01 OBESITIES, MORBID (HCC): ICD-10-CM

## 2020-02-19 DIAGNOSIS — Z00.00 PHYSICAL EXAM: ICD-10-CM

## 2020-02-19 DIAGNOSIS — N39.0 URINARY TRACT INFECTION WITHOUT HEMATURIA, SITE UNSPECIFIED: ICD-10-CM

## 2020-02-19 DIAGNOSIS — Z13.1 SCREENING FOR DIABETES MELLITUS: ICD-10-CM

## 2020-02-19 DIAGNOSIS — Z23 ENCOUNTER FOR IMMUNIZATION: ICD-10-CM

## 2020-02-19 DIAGNOSIS — Z13.29 SCREENING FOR HYPOTHYROIDISM: ICD-10-CM

## 2020-02-19 DIAGNOSIS — Z13.220 SCREENING FOR HYPERLIPIDEMIA: ICD-10-CM

## 2020-02-19 LAB
A-G RATIO,AGRAT: 1.2 RATIO
ALBUMIN SERPL-MCNC: 4.3 G/DL (ref 3.9–5.4)
ALP SERPL-CCNC: 70 U/L (ref 38–126)
ALT SERPL-CCNC: 16 U/L (ref 0–35)
ANION GAP SERPL CALC-SCNC: 14 MMOL/L
AST SERPL W P-5'-P-CCNC: 18 U/L (ref 14–36)
BACTERIA,BACTU: ABNORMAL
BILIRUB SERPL-MCNC: 0.4 MG/DL (ref 0.2–1.3)
BILIRUB UR QL: NEGATIVE
BUN SERPL-MCNC: 13 MG/DL (ref 7–17)
BUN/CREATININE RATIO,BUCR: 16 RATIO
CALCIUM SERPL-MCNC: 9.7 MG/DL (ref 8.4–10.2)
CHLORIDE SERPL-SCNC: 103 MMOL/L (ref 98–107)
CHOL/HDL RATIO,CHHD: 3 RATIO (ref 0–4)
CHOLEST SERPL-MCNC: 157 MG/DL (ref 0–200)
CLARITY: CLEAR
CO2 SERPL-SCNC: 26 MMOL/L (ref 22–32)
COLOR UR: ABNORMAL
CREAT SERPL-MCNC: 0.8 MG/DL (ref 0.7–1.2)
GLOBULIN,GLOB: 3.5
GLUCOSE 24H UR-MRATE: NEGATIVE G/(24.H)
GLUCOSE SERPL-MCNC: 92 MG/DL (ref 65–105)
HBA1C MFR BLD HPLC: 5.3 % (ref 4–5.7)
HDLC SERPL-MCNC: 58 MG/DL (ref 35–130)
HGB UR QL STRIP: NEGATIVE
KETONES UR QL STRIP.AUTO: NEGATIVE
LDL/HDL RATIO,LDHD: 1 RATIO
LDLC SERPL CALC-MCNC: 66 MG/DL (ref 0–130)
LEUKOCYTE ESTERASE: ABNORMAL
NITRITE UR QL STRIP.AUTO: NEGATIVE
PH UR STRIP: 7 [PH] (ref 5–7)
POTASSIUM SERPL-SCNC: 4.4 MMOL/L (ref 3.6–5)
PROT SERPL-MCNC: 7.8 G/DL (ref 6.3–8.2)
PROT UR STRIP-MCNC: ABNORMAL MG/DL
RBC #/AREA URNS HPF: ABNORMAL #/HPF
SODIUM SERPL-SCNC: 143 MMOL/L (ref 137–145)
SP GR UR REFRACTOMETRY: 1.01 (ref 1–1.03)
TRIGL SERPL-MCNC: 167 MG/DL (ref 0–200)
UROBILINOGEN UR QL STRIP.AUTO: NEGATIVE
VLDLC SERPL CALC-MCNC: 33 MG/DL
WBC URNS QL MICRO: ABNORMAL #/HPF

## 2020-02-19 RX ORDER — CETIRIZINE HCL 10 MG
TABLET ORAL
COMMUNITY
End: 2022-03-15

## 2020-02-19 NOTE — PROGRESS NOTES
Jessica Boateng is a 51 y.o. female     Chief Complaint   Patient presents with    Complete Physical       Visit Vitals  /82 (BP 1 Location: Left arm, BP Patient Position: Sitting)   Pulse 74   Temp 98.5 °F (36.9 °C) (Oral)   Resp 16   Ht 5' 7.5\" (1.715 m)   Wt 272 lb 14.4 oz (123.8 kg)   LMP 01/29/2020   SpO2 97%   BMI 42.11 kg/m²       Health Maintenance Due   Topic Date Due    DTaP/Tdap/Td series (1 - Tdap) 06/04/1982    PAP AKA CERVICAL CYTOLOGY  08/05/2019       1. Have you been to the ER, urgent care clinic since your last visit? Hospitalized since your last visit? No    2. Have you seen or consulted any other health care providers outside of the [address] [place] [address] since your last visit? Include any pap smears or colon screening. No      Administered TDAP in left deltoid patient tolerated injection well.     YKJ#:[id number]    Exp:4/02/22    BOA:[id number]

## 2020-02-19 NOTE — PATIENT INSTRUCTIONS
DTaP (Diphtheria, Tetanus, Pertussis) Vaccine: What You Need to Know  Why get vaccinated? DTaP vaccine can help protect your child from diphtheria, tetanus, and pertussis. DIPHTHERIA (D) can cause breathing problems, paralysis, and heart failure. Before vaccines, diphtheria killed tens of thousands of children every year in the [place]. TETANUS (T) causes painful tightening of the muscles. It can cause \"locking\" of the jaw so you cannot open your mouth or swallow. About 1 person out of 5 who get tetanus dies. PERTUSSIS (aP), also known as Whooping Cough, causes coughing spells so bad that it is hard for infants and children to eat, drink, or breathe. It can cause pneumonia, seizures, brain damage, or death. Most children who are vaccinated with DTaP will be protected throughout childhood. Many more children would get these diseases if we stopped vaccinating. DTaP vaccine  Children should usually get 5 doses of DTaP vaccine, one dose at each of the following ages:  · 2 months  · 4 months  · 6 months  · 15-18 months  · 4-6 years  DTaP may be given at the same time as other vaccines. Also, sometimes a child can receive DTaP together with one or more other vaccines in a single shot. Some children should not get DTaP vaccine or should wait. DTaP is only for children younger than 9years old. DTaP vaccine is not appropriate for everyone - a small number of children should receive a different vaccine that contains only diphtheria and tetanus instead of DTaP. Tell your health care provider if your child:  · Has had an allergic reaction after a previous dose of DTaP, or has any severe, life-threatening allergies. · Has had a coma or long repeated seizures within 7 days after a dose of DTaP. · Has seizures or another nervous system problem. · Has had a condition called Guillain-Barré Syndrome (GBS). · Has had severe pain or swelling after a previous dose of DTaP or DT vaccine.   In some cases, your health care provider may decide to postpone your child's DTaP vaccination to a future visit. Children with minor illnesses, such as a cold, may be vaccinated. Children who are moderately or severely ill should usually wait until they recover before getting DTaP vaccine. Your health care provider can give you more information. Risks of a vaccine reaction  · Redness, soreness, swelling, and tenderness where the shot is given are common after DTaP. · Fever, fussiness, tiredness, poor appetite, and vomiting sometimes happen 1 to 3 days after DTaP vaccination. · More serious reactions, such as seizures, non-stop crying for 3 hours or more, or high fever (over 105°F) after DTaP vaccination happen much less often. Rarely, the vaccine is followed by swelling of the entire arm or leg, especially in older children when they receive their fourth or fifth dose. · Long-term seizures, coma, lowered consciousness, or permanent brain damage happen extremely rarely after DTaP vaccination. As with any medicine, there is a very remote chance of a vaccine causing a severe allergic reaction, other serious injury, or death. What if there is a serious problem? An allergic reaction could occur after the child leaves the clinic. If you see signs of a severe allergic reaction (hives, swelling of the face and throat, difficulty breathing, a fast heartbeat, dizziness, or weakness), call 9-1-1 and get the child to the nearest hospital.  For other signs that concern you, call your child's health care provider. Serious reactions should be reported to the Vaccine Adverse Event Reporting System (VAERS). Your doctor will usually file this report, or you can do it yourself. Visit www.vaers. hhs.gov or call 3-653.465.6609. VAERS is only for reporting reactions, it does not give medical advice.   The [employer] [place] Vaccine Injury Compensation Program  The National Vaccine Injury Compensation Program is a federal program that was created to compensate people who may have been injured by certain vaccines. Visit www.hrsa.gov/vaccinecompensation or call 1-439.274.1666 to learn about the program and about filing a claim. There is a time limit to file a claim for compensation. How can I learn more? · Ask your health care provider. · Call your local or state health department. · Contact the Centers for Disease Control and Prevention (CDC):  ? Call 5-452.903.2953 (1-800-CDC-INFO) or  ? Visit CDC's website at www.cdc.gov/vaccines  Vaccine Information Statement  DTaP (Diphtheria, Tetanus, Pertussis) Vaccine  (8/24/2018)  [address] 952BR-23  Department of Health and Human Services  Centers for Disease Control and Prevention  Many Vaccine Information Statements are available in Italian and other languages. See www.immunize.org/vis. Muchas hojas de información sobre vacunas están disponibles en español y en otros idiomas. Visite www.immunize.org/vis. Care instructions adapted under license by [employer] (which disclaims liability or warranty for this information). If you have questions about a medical condition or this instruction, always ask your healthcare professional. [place] [address] any warranty or liability for your use of this information.

## 2020-02-19 NOTE — PROGRESS NOTES
Subjective:  Ms. Rasta Campoverde is a pleasant 51year old lady who comes in today for updated history and physical.  She has no complaints. Past Medical History:   Diagnosis Date    Allergic rhinitis 10/5/2017    Annual physical exam 10/5/2017    Bilateral foot pain 10/5/2017    Depression 10/5/2017    DJD (degenerative joint disease) of knee 10/5/2017    Left knee pain 10/5/2017    Obesities, morbid ([address] 10/5/2017    Plantar fasciitis, bilateral 10/5/2017    Raynaud's disease 10/5/2017    Screening for diabetes mellitus 10/5/2017    Screening for hyperlipidemia 10/5/2017    Screening for hypothyroidism 10/5/2017    Vitamin D deficiency 10/5/2017     Past Surgical History:   Procedure Laterality Date    HX WISDOM TEETH EXTRACTION      HX WRIST FRACTURE [place]  2005       Current Outpatient Medications on File Prior to Visit   Medication Sig Dispense Refill    cetirizine (ZYRTEC) 10 mg tablet Take  by mouth.  budesonide-formoteroL (SYMBICORT) 160-4.5 mcg/actuation HFAA Take 2 Puffs by inhalation two (2) times a day. 1 Inhaler 12    escitalopram oxalate (LEXAPRO) 10 mg tablet TAKE 1 TABLET DAILY 90 Tab 4    buPROPion SR (WELLBUTRIN SR) 150 mg SR tablet TAKE 1 TABLET TWICE A  Tab 4    drospirenone-ethinyl estradiol (BRI, 28,) 3-0.03 mg tab Take  by mouth daily.  multivitamin (ONE A DAY) tablet Take 1 Tab by mouth daily.  fluticasone (FLONASE) 50 mcg/actuation nasal spray 2 Sprays by Both Nostrils route daily.  [DISCONTINUED] predniSONE (STERAPRED DS) 10 mg dose pack Take as directed on packet 21 Tab 0    [DISCONTINUED] loratadine (CLARITIN) 10 mg tablet Take 10 mg by mouth. No current facility-administered medications on file prior to visit. Not on File  Past Medical History/Surgeries:    1. ORIF of right hand fracture. 2. LASIK surgery. Illnesses:  1. Chronic depression. 2. Allergic rhinitis. 3. Obesity with BMI of 42. 11.  4. Asthma.     Family History: Father committed suicide at age 67. Mother is 79years of age, alive with hypertension. One sister had cervical cancer, but is doing well. Social History:  She is . She works for a nonprofit organization. She has four children, age 21, 25 and twins 12years old. Allergies:  None. Medications:  1. Zyrtec 10 mg daily. 2. Symbicort 160/4.5, two puffs twice daily. 3. Lexapro 10 mg daily. 4. Wellbutrin  mg twice daily. 5. Jamia 28 daily. 6. Multivitamin daily. 7. Flonase, two sprays both nostrils daily. Habits:  Nonsmoker and non drinker. Review of Systems:  HEENT:  Denies any headaches, dizziness or blurred vision. She does wear glasses and gets regular eye exam.  CVR:  Denies any chest pain or palpitations. Denies any syncopal episode. Denies shortness of breath, cough, wheezing, PND or orthopnea. Denies any ankle edema. GI:  Appetite is good, weight has been up and down in the last year. Does have a normal bowel pattern without presence of blood in stools or melena. :  Denies any urinary symptoms. GYN:  She does get regular pelvic and pap, as well as mammogram, through the [facility] [place]. Immunizations:  Decline flu vaccine and is in need of getting a TDAP. Physical Examination:  GENERAL:  Pleasant, obese lady in no acute distress. She is alert and oriented. She answers my questions appropriately. VITALS:  Blood Pressure:  126/82. Pulse:  74.  Respirations:  16.  Temperature:  98.5. O2 sat:  97. HEENT:  Normocephalic, atraumatic. PERRLA, EOMI. TMs normal.  Mouth mucosa pink. Tongue midline. Pharynx normal.  NECK:  Supple without adenopathy, thyromegaly or carotid bruits. CHEST:  Lungs clear to auscultation, no rales or wheezes. CV:  Heart regular rhythm without murmur or gallop. ABDOMEN:  Obese, soft, non tender, no appreciable organomegaly or masses. No lymphadenopathy. No CVA tenderness. EXTREMITIES:  No edema or calf tenderness. Distal pulses were present. NEUROLOGIC:  Cranial nerves II-XII intact. Excellent strength in the upper and lower extremities against resistance. Sensation is preserved. Romberg is negative. Reflexes 2+ and symmetrical.    Impression:  1. Chronic depression. 2. Allergic rhinitis. 3. Marked obesity. 4. Asthma    Plan:  1. She was fasting so it was opted to do all of her lab studies. I will call her as soon as I have the results, otherwise we will continue to see her yearly. 2. Once again we discussed the importance of a prudent diet, weight loss and exercise to keep BMI within acceptable level. 3. While in the office we did update her TDAP.

## 2020-02-20 LAB
25(OH)D3 SERPL-MCNC: 61 NG/ML (ref 30–96)
BASOPHILS # BLD AUTO: 0.1 X10E3/UL (ref 0–0.2)
BASOPHILS NFR BLD AUTO: 1 %
EOSINOPHIL # BLD AUTO: 0.1 X10E3/UL (ref 0–0.4)
EOSINOPHIL NFR BLD AUTO: 2 %
ERYTHROCYTE [DISTWIDTH] IN BLOOD BY AUTOMATED COUNT: 13.8 % (ref 11.7–15.4)
HCT VFR BLD AUTO: 39.9 % (ref 34–46.6)
HGB BLD-MCNC: 13 G/DL (ref 11.1–15.9)
IMM GRANULOCYTES # BLD AUTO: 0 X10E3/UL (ref 0–0.1)
IMM GRANULOCYTES NFR BLD AUTO: 0 %
LYMPHOCYTES # BLD AUTO: 3.3 X10E3/UL (ref 0.7–3.1)
LYMPHOCYTES NFR BLD AUTO: 42 %
MCH RBC QN AUTO: 28.6 PG (ref 26.6–33)
MCHC RBC AUTO-ENTMCNC: 32.6 G/DL (ref 31.5–35.7)
MCV RBC AUTO: 88 FL (ref 79–97)
MONOCYTES # BLD AUTO: 0.4 X10E3/UL (ref 0.1–0.9)
MONOCYTES NFR BLD AUTO: 5 %
NEUTROPHILS # BLD AUTO: 4 X10E3/UL (ref 1.4–7)
NEUTROPHILS NFR BLD AUTO: 50 %
PLATELET # BLD AUTO: 257 X10E3/UL (ref 150–450)
RBC # BLD AUTO: 4.55 X10E6/UL (ref 3.77–5.28)
T4 FREE SERPL-MCNC: 0.74 NG/DL (ref 0.58–2.3)
TSH SERPL DL<=0.05 MIU/L-ACNC: 1.39 UIU/ML (ref 0.34–5.6)
WBC # BLD AUTO: 7.8 X10E3/UL (ref 3.4–10.8)

## 2020-02-22 LAB — BACTERIA UR CULT: NORMAL

## 2020-03-10 ENCOUNTER — OFFICE VISIT (OUTPATIENT)
Dept: INTERNAL MEDICINE CLINIC | Age: 50
End: 2020-03-10

## 2020-03-10 VITALS
RESPIRATION RATE: 18 BRPM | TEMPERATURE: 98.6 F | BODY MASS INDEX: 41.07 KG/M2 | DIASTOLIC BLOOD PRESSURE: 76 MMHG | WEIGHT: 271 LBS | HEART RATE: 85 BPM | HEIGHT: 68 IN | OXYGEN SATURATION: 95 % | SYSTOLIC BLOOD PRESSURE: 128 MMHG

## 2020-03-10 DIAGNOSIS — R59.9 LYMPH NODES ENLARGED: Primary | ICD-10-CM

## 2020-03-10 NOTE — PROGRESS NOTES
Subjective:  Ms. Abigail Jauregui is a pleasant 44year old lady who comes in today for evaluation of a lump she has felt above her left clavicle in the last few days. She was here for a complete H&P on 02/19/20. At that time we gave her a tetanus in her left arm. She was sore for a few days, but had no repercussions thereafter. She also recently had a mammogram.  She was just putting some lotion on and came across that small node. It is painless. Denies chills or fever. Lab studies at her last visit was entirely normal.   Past Medical History:   Diagnosis Date    Allergic rhinitis 10/5/2017    Annual physical exam 10/5/2017    Bilateral foot pain 10/5/2017    Depression 10/5/2017    DJD (degenerative joint disease) of knee 10/5/2017    Left knee pain 10/5/2017    Obesities, morbid ([address] 10/5/2017    Plantar fasciitis, bilateral 10/5/2017    Raynaud's disease 10/5/2017    Screening for diabetes mellitus 10/5/2017    Screening for hyperlipidemia 10/5/2017    Screening for hypothyroidism 10/5/2017    Vitamin D deficiency 10/5/2017     Past Surgical History:   Procedure Laterality Date    HX WISDOM TEETH EXTRACTION      H[place] WRIST FRACTURE [place]  2005       Current Outpatient Medications on File Prior to Visit   Medication Sig Dispense Refill    cetirizine (ZYRTEC) 10 mg tablet Take  by mouth.  budesonide-formoteroL (SYMBICORT) 160-4.5 mcg/actuation HFAA Take 2 Puffs by inhalation two (2) times a day. 1 Inhaler 12    escitalopram oxalate (LEXAPRO) 10 mg tablet TAKE 1 TABLET DAILY 90 Tab 4    buPROPion SR (WELLBUTRIN SR) 150 mg SR tablet TAKE 1 TABLET TWICE A  Tab 4    drospirenone-ethinyl estradiol (BRI, 28,) 3-0.03 mg tab Take  by mouth daily.  multivitamin (ONE A DAY) tablet Take 1 Tab by mouth daily.  fluticasone (FLONASE) 50 mcg/actuation nasal spray 2 Sprays by Both Nostrils route daily. No current facility-administered medications on file prior to visit.       Not on File  Physical Examination:  GENERAL:  Pleasant, obese lady in no acute distress. She is alert and oriented. VITALS:  Blood pressure:  128/76. Pulse:  85.  Respirations:  18.  Temperature:  98.6. O2 sat:  95. HEENT:  Normocephalic, atraumatic. NECK:  Supple without adenopathy. No carotid bruits. No thyromegaly. She does have a small, freely movable, supraclavicular node. It is non tender. No other adenopathy noted. CHEST:  Lungs clear to auscultation, no rales or wheezes. CV:  Heart regular rhythm without murmur. EXTREMITIES:  No edema or calf tenderness. Distal pulses were present. Impression:  1. Left supraclavicular node. Plan:  1. It was opted to refer her to general surgery for possible ultrasound and/or needle biopsy. She is fully agreeable. Azathioprine Counseling:  I discussed with the patient the risks of azathioprine including but not limited to myelosuppression, immunosuppression, hepatotoxicity, lymphoma, and infections.  The patient understands that monitoring is required including baseline LFTs, Creatinine, possible TPMP genotyping and weekly CBCs for the first month and then every 2 weeks thereafter.  The patient verbalized understanding of the proper use and possible adverse effects of azathioprine.  All of the patient's questions and concerns were addressed.

## 2020-03-10 NOTE — PATIENT INSTRUCTIONS
Swollen Lymph Nodes: Care Instructions  Your Care Instructions    Lymph nodes are small, bean-shaped glands throughout the body. They help your body fight germs and infections. Lymph nodes often swell when there is a problem such as an injury, infection, or tumor. · The nodes in your neck, under your chin, or behind your ears may swell when you have a cold or sore throat. · An injury or infection in a leg or foot can make the nodes in your groin swell. · Sometimes medicine can make lymph nodes swell, but this is rare. Treatment depends on what caused your nodes to swell. Usually the nodes return to normal size without a problem. Follow-up care is a key part of your treatment and safety. Be sure to make and go to all appointments, and call your doctor if you are having problems. It's also a good idea to know your test results and keep a list of the medicines you take. How can you care for yourself at home? · Take your medicines exactly as prescribed. Call your doctor if you think you are having a problem with your medicine. · Avoid irritation. ? Do not squeeze or pick at the lump. ? Do not stick a needle in it. · Prevent infection. Do not squeeze, drain, or puncture a painful lump. Doing this can irritate or inflame the lump, push any existing infection deeper into the skin, or cause severe bleeding. · Get extra rest. Slow down just a little from your usual routine. · Drink plenty of fluids, enough so that your urine is light yellow or clear like water. If you have kidney, heart, or liver disease and have to limit fluids, talk with your doctor before you increase the amount of fluids you drink. · Take an over-the-counter pain medicine, such as acetaminophen (Tylenol), ibuprofen (Advil, Motrin), or naproxen (Aleve). Read and follow all instructions on the label. · Do not take two or more pain medicines at the same time unless the doctor told you to.  Many pain medicines have acetaminophen, which is Tylenol. Too much acetaminophen (Tylenol) can be harmful. When should you call for help? Call your doctor now or seek immediate medical care if:    · You have worse symptoms of infection, such as:  ? Increased pain, swelling, warmth, or redness. ? Red streaks leading from the area. ? Pus draining from the area. ? A fever.    Watch closely for changes in your health, and be sure to contact your doctor if:    · Your lymph nodes do not get smaller or do not return to normal.     · You do not get better as expected. Where can you learn more? Go to http://gaetano-delon.info/. Enter W410 in the search box to learn more about \"Swollen Lymph Nodes: Care Instructions. \"  Current as of: June 9, 2019  Content Version: 12.2  © 9025-2110 [employer]. Care instructions adapted under license by [employer] (which disclaims liability or warranty for this information). If you have questions about a medical condition or this instruction, always ask your healthcare professional. [address] any warranty or liability for your use of this information.

## 2020-03-10 NOTE — PROGRESS NOTES
Tramaine Nicole is a 51 y.o. female     Chief Complaint   Patient presents with    Mass     lump on neck       Visit Vitals  /76 (BP 1 Location: Left arm, BP Patient Position: Sitting)   Pulse 85   Temp 98.6 °F (37 °C) (Oral)   Resp 18   Ht 5' 7.5\" (1.715 m)   Wt 271 lb (122.9 kg)   LMP 03/10/2020   SpO2 95%   BMI 41.82 kg/m²       There are no preventive care reminders to display for this patient. 1. Have you been to the ER, urgent care clinic since your last visit? Hospitalized since your last visit? No    2. Have you seen or consulted any other health care providers outside of the [address] [place] [address] since your last visit? Include any pap smears or colon screening.  No

## 2020-04-13 ENCOUNTER — OFFICE VISIT (OUTPATIENT)
Dept: SURGERY | Age: 50
End: 2020-04-13

## 2020-04-13 VITALS
SYSTOLIC BLOOD PRESSURE: 128 MMHG | OXYGEN SATURATION: 94 % | TEMPERATURE: 97.6 F | HEART RATE: 87 BPM | BODY MASS INDEX: 41.52 KG/M2 | HEIGHT: 68 IN | WEIGHT: 274 LBS | DIASTOLIC BLOOD PRESSURE: 90 MMHG

## 2020-04-13 DIAGNOSIS — R09.89 LYMPH NODE SYMPTOM: Primary | ICD-10-CM

## 2020-04-13 RX ORDER — DROSPIRENONE AND ETHINYL ESTRADIOL 0.02-3(28)
KIT ORAL
COMMUNITY

## 2020-04-13 NOTE — PROGRESS NOTES
To: Candance Fore, NP    From: Miles Farooq MD    Thank you for sending [place] [facility] to see us. Hope you are well -- Dao Villalobos. Please note that this dictation was completed with [employer], the computer voice recognition software. Quite often unanticipated grammatical, syntax, homophones, and other interpretive errors are inadvertently transcribed by the software. Please disregard these errors. Please excuse any errors that have escaped final proofreading. Encounter Date: 4/13/2020  History and Physical    Assessment:   Left supraclavicular lymph node. Not enlarged by ultrasound size criteria. Solitary palpable node. No other LN basins affected. No B-symptoms. Likely inflammatory, possibly related to Tdap. Plan:   We discussed the fact that the node is not abnormal by size criteria. We discussed that it is possibly related to her recent vaccination in the left upper arm. We discussed the options of doing FNA versus excisional biopsy versus watchful waiting. Given the small size of the lymph node FNA would have a high rate of false negative. Given her lack of symptoms and the fact that this is a solitary node, there is low suspicion for a more concerning etiology. I recommended that we watch it over the next month or 2 and if it is persistent we can always revisit biopsy at a safer time in terms of the COVID-19 pandemic. She was in agreement with the plan. HPI:   Sage Sexton is a 51 y.o. female who is seen in consultation at the request of Candance Fore, NP for evaluation of a new nodule over her left clavicle. She noticed it there for about a month. She had a Tdap shot in the left arm shortly before noticing it. She says that she did have soreness in the arm after the injection but does not recall any redness or swelling. She says that the nodule has remained the same size and is rather small.   In fact sometimes she has a hard time finding it. She has not noticed any nodules in her neck, over the other clavicle, under her arms, or in her groin. She denies new fatigue, anorexia, night sweats, fever, unusual bleeding or bruising. No recent tick bites or cat scratches. Past Medical History:   Diagnosis Date    Allergic rhinitis 10/5/2017    Annual physical exam 10/5/2017    Bilateral foot pain 10/5/2017    Depression 10/5/2017    DJD (degenerative joint disease) of knee 10/5/2017    Left knee pain 10/5/2017    Obesities, morbid ([address] 10/5/2017    Plantar fasciitis, bilateral 10/5/2017    Raynaud's disease 10/5/2017    Screening for diabetes mellitus 10/5/2017    Screening for hyperlipidemia 10/5/2017    Screening for hypothyroidism 10/5/2017    Vitamin D deficiency 10/5/2017     Past Surgical History:   Procedure Laterality Date    HX WISDOM TEETH EXTRACTION      [place]RIST [place]RACTURE [place]  2005      Family History   Problem Relation Age of Onset    No Known Problems Mother     Colon Polyps Father     Cancer Father         esophageal    Cancer Sister      Social History     Tobacco Use    Smoking status: Never Smoker    Smokeless tobacco: Never Used   Substance Use Topics    Alcohol use: Yes     Frequency: Monthly or less     Comment: occasional      Current Outpatient Medications   Medication Sig    drospirenone-ethinyl estradioL (April, 28,) 3-0.02 mg tab APRIL (28) 3 mg-0.02 mg tablet   Take 1 tablet every day by oral route.  cetirizine (ZYRTEC) 10 mg tablet Take  by mouth.  budesonide-formoteroL (SYMBICORT) 160-4.5 mcg/actuation HFAA Take 2 Puffs by inhalation two (2) times a day.  escitalopram oxalate (LEXAPRO) 10 mg tablet TAKE 1 TABLET DAILY    buPROPion SR (WELLBUTRIN SR) 150 mg SR tablet TAKE 1 TABLET TWICE A DAY    fluticasone (FLONASE) 50 mcg/actuation nasal spray 2 Sprays by Both Nostrils route daily.  drospirenone-ethinyl estradiol (BRI, 28,) 3-0.03 mg tab Take  by mouth daily.     multivitamin (ONE A DAY) tablet Take 1 Tab by mouth daily. No current facility-administered medications for this visit. Allergies:  No Known Allergies    Review of Systems:  10 systems reviewed. See scanned sheet in \"Media\" section. See HPI for pertinent positives and negatives. Objective:     Visit Vitals  /90   Pulse 87   Temp 97.6 °F (36.4 °C)   Ht 5' 7.5\" (1.715 m)   Wt 124.3 kg (274 lb)   SpO2 94%   BMI 42.28 kg/m²       Physical Exam:  General appearance  Alert, cooperative, no distress, appears stated age   [de-identified] Anicteric           Lungs   Clear to auscultation bilaterally   Heart  Regular rate and rhythm. Abdomen   Soft, non-tender. Extremities no cyanosis or edema   Pulses 2+ right radial       Lymph nodes  small solitary node over the left clavicle. No palpable nodules in the neck or axillae. Neurologic Without overt sensory or motor deficit     On ultrasound the nodule corresponds to a 6 mm supraclavicular lymph node. It is nontender. It is solitary.     Signed By: Lisa Segal MD     April 13, 2020

## 2020-04-13 NOTE — Clinical Note
4/13/20 Patient: Paige Low YOB: 1970 Date of Visit: 4/13/2020 [address] [id number] VIA In Basket Dear Jn Quinteros NP, Thank you for referring Ms. Vivien Christopher to  [facility] for evaluation. My notes for this consultation are attached. If you have questions, please do not hesitate to call me. I look forward to following your patient along with you.  
 
 
Sincerely, 
 
Nohemi Bazzi MD

## 2020-04-13 NOTE — PROGRESS NOTES
Chief Complaint   Patient presents with    Skin Problem     [facility] left lymph node nodule     Discussed advanced directive. Patient states that she does not have an advanced directive.

## 2020-09-16 RX ORDER — BUDESONIDE AND FORMOTEROL FUMARATE DIHYDRATE 160; 4.5 UG/1; UG/1
2 AEROSOL RESPIRATORY (INHALATION) 2 TIMES DAILY
Qty: 1 INHALER | Refills: 12 | Status: SHIPPED | OUTPATIENT
Start: 2020-09-16 | End: 2021-05-18 | Stop reason: SDUPTHER

## 2020-09-16 NOTE — TELEPHONE ENCOUNTER
Last Refill: 2/3/2020  Last Visit: 3/10/2020  Next Visit: None    Requested Prescriptions     Pending Prescriptions Disp Refills    budesonide-formoteroL (SYMBICORT) 160-4.5 mcg/actuation HFAA 1 Inhaler 12     Sig: Take 2 Puffs by inhalation two (2) times a day.

## 2020-11-27 RX ORDER — BUPROPION HYDROCHLORIDE 150 MG/1
TABLET, EXTENDED RELEASE ORAL
Qty: 180 TAB | Refills: 3 | Status: SHIPPED | OUTPATIENT
Start: 2020-11-27 | End: 2021-10-31

## 2021-02-26 RX ORDER — ESCITALOPRAM OXALATE 10 MG/1
TABLET ORAL
Qty: 90 TAB | Refills: 3 | Status: SHIPPED | OUTPATIENT
Start: 2021-02-26 | End: 2022-02-14 | Stop reason: SDUPTHER

## 2021-05-18 ENCOUNTER — OFFICE VISIT (OUTPATIENT)
Dept: INTERNAL MEDICINE CLINIC | Age: 51
End: 2021-05-18
Payer: COMMERCIAL

## 2021-05-18 VITALS
TEMPERATURE: 97.4 F | DIASTOLIC BLOOD PRESSURE: 76 MMHG | WEIGHT: 270.7 LBS | RESPIRATION RATE: 18 BRPM | SYSTOLIC BLOOD PRESSURE: 128 MMHG | BODY MASS INDEX: 41.03 KG/M2 | OXYGEN SATURATION: 97 % | HEIGHT: 68 IN | HEART RATE: 99 BPM

## 2021-05-18 DIAGNOSIS — Z80.0 FAMILY HISTORY OF COLON CANCER IN FATHER: ICD-10-CM

## 2021-05-18 DIAGNOSIS — F32.A DEPRESSION, UNSPECIFIED DEPRESSION TYPE: ICD-10-CM

## 2021-05-18 DIAGNOSIS — Z00.00 PHYSICAL EXAM: ICD-10-CM

## 2021-05-18 DIAGNOSIS — Z11.59 NEED FOR HEPATITIS C SCREENING TEST: ICD-10-CM

## 2021-05-18 DIAGNOSIS — Z13.1 SCREENING FOR DIABETES MELLITUS: ICD-10-CM

## 2021-05-18 DIAGNOSIS — H93.12 TINNITUS OF LEFT EAR: Primary | ICD-10-CM

## 2021-05-18 DIAGNOSIS — Z13.220 SCREENING FOR HYPERLIPIDEMIA: ICD-10-CM

## 2021-05-18 DIAGNOSIS — E55.9 VITAMIN D DEFICIENCY: ICD-10-CM

## 2021-05-18 DIAGNOSIS — H93.11 TINNITUS OF RIGHT EAR: ICD-10-CM

## 2021-05-18 DIAGNOSIS — Z13.29 SCREENING FOR HYPOTHYROIDISM: ICD-10-CM

## 2021-05-18 DIAGNOSIS — N39.0 URINARY TRACT INFECTION WITHOUT HEMATURIA, SITE UNSPECIFIED: ICD-10-CM

## 2021-05-18 LAB
25(OH)D3 SERPL-MCNC: 40.1 NG/ML (ref 30–100)
ALBUMIN SERPL-MCNC: 3.7 G/DL (ref 3.5–5)
ALBUMIN/GLOB SERPL: 1 {RATIO} (ref 1.1–2.2)
ALP SERPL-CCNC: 83 U/L (ref 45–117)
ALT SERPL-CCNC: 19 U/L (ref 12–78)
ANION GAP SERPL CALC-SCNC: 9 MMOL/L (ref 5–15)
APPEARANCE UR: ABNORMAL
AST SERPL-CCNC: 11 U/L (ref 15–37)
BACTERIA URNS QL MICRO: ABNORMAL /HPF
BASOPHILS # BLD: 0.1 K/UL (ref 0–0.1)
BASOPHILS NFR BLD: 1 % (ref 0–1)
BILIRUB SERPL-MCNC: 0.3 MG/DL (ref 0.2–1)
BILIRUB UR QL: NEGATIVE
BUN SERPL-MCNC: 12 MG/DL (ref 6–20)
BUN/CREAT SERPL: 16 (ref 12–20)
CALCIUM SERPL-MCNC: 9.3 MG/DL (ref 8.5–10.1)
CHLORIDE SERPL-SCNC: 106 MMOL/L (ref 97–108)
CHOLEST SERPL-MCNC: 170 MG/DL
CO2 SERPL-SCNC: 24 MMOL/L (ref 21–32)
COLOR UR: ABNORMAL
CREAT SERPL-MCNC: 0.77 MG/DL (ref 0.55–1.02)
DIFFERENTIAL METHOD BLD: ABNORMAL
EOSINOPHIL # BLD: 0.1 K/UL (ref 0–0.4)
EOSINOPHIL NFR BLD: 1 % (ref 0–7)
EPITH CASTS URNS QL MICRO: ABNORMAL /LPF
ERYTHROCYTE [DISTWIDTH] IN BLOOD BY AUTOMATED COUNT: 14.1 % (ref 11.5–14.5)
EST. AVERAGE GLUCOSE BLD GHB EST-MCNC: 114 MG/DL
GLOBULIN SER CALC-MCNC: 3.8 G/DL (ref 2–4)
GLUCOSE SERPL-MCNC: 93 MG/DL (ref 65–100)
GLUCOSE UR STRIP.AUTO-MCNC: NEGATIVE MG/DL
HBA1C MFR BLD: 5.6 % (ref 4–5.6)
HCT VFR BLD AUTO: 40 % (ref 35–47)
HCV AB SERPL QL IA: NONREACTIVE
HCV COMMENT,HCGAC: NORMAL
HDLC SERPL-MCNC: 61 MG/DL
HDLC SERPL: 2.8 {RATIO} (ref 0–5)
HGB BLD-MCNC: 13.1 G/DL (ref 11.5–16)
HGB UR QL STRIP: ABNORMAL
HYALINE CASTS URNS QL MICRO: ABNORMAL /LPF (ref 0–5)
IMM GRANULOCYTES # BLD AUTO: 0 K/UL (ref 0–0.04)
IMM GRANULOCYTES NFR BLD AUTO: 0 % (ref 0–0.5)
KETONES UR QL STRIP.AUTO: ABNORMAL MG/DL
LDLC SERPL CALC-MCNC: 78.6 MG/DL (ref 0–100)
LEUKOCYTE ESTERASE UR QL STRIP.AUTO: ABNORMAL
LYMPHOCYTES # BLD: 2.9 K/UL (ref 0.8–3.5)
LYMPHOCYTES NFR BLD: 36 % (ref 12–49)
MCH RBC QN AUTO: 29.4 PG (ref 26–34)
MCHC RBC AUTO-ENTMCNC: 32.8 G/DL (ref 30–36.5)
MCV RBC AUTO: 89.7 FL (ref 80–99)
MONOCYTES # BLD: 0.4 K/UL (ref 0–1)
MONOCYTES NFR BLD: 4 % (ref 5–13)
NEUTS SEG # BLD: 4.6 K/UL (ref 1.8–8)
NEUTS SEG NFR BLD: 58 % (ref 32–75)
NITRITE UR QL STRIP.AUTO: NEGATIVE
NRBC # BLD: 0 K/UL (ref 0–0.01)
NRBC BLD-RTO: 0 PER 100 WBC
PH UR STRIP: 7 [PH] (ref 5–8)
PLATELET # BLD AUTO: 238 K/UL (ref 150–400)
PMV BLD AUTO: 11.7 FL (ref 8.9–12.9)
POTASSIUM SERPL-SCNC: 4.3 MMOL/L (ref 3.5–5.1)
PROT SERPL-MCNC: 7.5 G/DL (ref 6.4–8.2)
PROT UR STRIP-MCNC: 30 MG/DL
RBC # BLD AUTO: 4.46 M/UL (ref 3.8–5.2)
RBC #/AREA URNS HPF: ABNORMAL /HPF (ref 0–5)
SODIUM SERPL-SCNC: 139 MMOL/L (ref 136–145)
SP GR UR REFRACTOMETRY: 1.02 (ref 1–1.03)
T4 FREE SERPL-MCNC: 0.9 NG/DL (ref 0.8–1.5)
TRIGL SERPL-MCNC: 152 MG/DL (ref ?–150)
TSH SERPL DL<=0.05 MIU/L-ACNC: 1.81 UIU/ML (ref 0.36–3.74)
UA: UC IF INDICATED,UAUC: ABNORMAL
UR CULT HOLD, URHOLD: NORMAL
UROBILINOGEN UR QL STRIP.AUTO: 0.2 EU/DL (ref 0.2–1)
VLDLC SERPL CALC-MCNC: 30.4 MG/DL
WBC # BLD AUTO: 7.9 K/UL (ref 3.6–11)
WBC URNS QL MICRO: ABNORMAL /HPF (ref 0–4)

## 2021-05-18 PROCEDURE — 99396 PREV VISIT EST AGE 40-64: CPT | Performed by: NURSE PRACTITIONER

## 2021-05-18 RX ORDER — BUDESONIDE AND FORMOTEROL FUMARATE DIHYDRATE 160; 4.5 UG/1; UG/1
2 AEROSOL RESPIRATORY (INHALATION) 2 TIMES DAILY
Qty: 3 INHALER | Refills: 3 | Status: SHIPPED | OUTPATIENT
Start: 2021-05-18 | End: 2022-05-20 | Stop reason: SDUPTHER

## 2021-05-18 RX ORDER — PREDNISONE 10 MG/1
TABLET ORAL
Qty: 21 TAB | Refills: 0 | Status: SHIPPED | OUTPATIENT
Start: 2021-05-18 | End: 2021-06-11 | Stop reason: SDUPTHER

## 2021-05-18 NOTE — PROGRESS NOTES
Subjective:  Ms. Galvin Romberg is a pleasant 16-year-old lady, who comes in today for her updated history and physical.  She has several complaints. 1. She gives a several week history of right lower back pain that radiates down the left leg. She denies any injury and was not aware of any precipitating factors. The pain is worse if she sits for a period of time or gets up from a sitting to standing position. She has had some numbness and tingling going down this leg. She has been using some ibuprofen occasionally that helps minimally. Denies any bowel or bladder difficulties. 2. Left ear tinnitus that she has had intermittently for the last year. She thinks she is losing some hearing. She denies any pain. She has had no chills or fever. 3. Anxiety/depression, managed on Lexapro 10 mg daily. She is still in counseling and continues to struggle from time to time. She is disappointed about her weight gain.   Past Medical History:   Diagnosis Date    Allergic rhinitis 10/5/2017    Annual physical exam 10/5/2017    Bilateral foot pain 10/5/2017    Depression 10/5/2017    DJD (degenerative joint disease) of knee 10/5/2017    Left knee pain 10/5/2017    Obesities, morbid ([address] 10/5/2017    Plantar fasciitis, bilateral 10/5/2017    Raynaud's disease 10/5/2017    Screening for diabetes mellitus 10/5/2017    Screening for hyperlipidemia 10/5/2017    Screening for hypothyroidism 10/5/2017    Vitamin D deficiency 10/5/2017     Past Surgical History:   Procedure Laterality Date    HX WISDOM TEETH EXTRACTION      HX WRIST FRACTURE [address]  2005       Current Outpatient Medications on File Prior to Visit   Medication Sig Dispense Refill    escitalopram oxalate (LEXAPRO) 10 mg tablet TAKE 1 TABLET DAILY 90 Tab 3    buPROPion SR (WELLBUTRIN SR) 150 mg SR tablet TAKE 1 TABLET TWICE A  Tab 3    drospirenone-ethinyl estradioL (April, 28,) 3-0.02 mg tab APRIL (28) 3 mg-0.02 mg tablet   Take 1 tablet every day by oral route.  fluticasone (FLONASE) 50 mcg/actuation nasal spray 2 Sprays by Both Nostrils route daily.  [DISCONTINUED] budesonide-formoteroL (SYMBICORT) 160-4.5 mcg/actuation HFAA Take 2 Puffs by inhalation two (2) times a day. 1 Inhaler 12    cetirizine (ZYRTEC) 10 mg tablet Take  by mouth.  drospirenone-ethinyl estradiol (BRI, 28,) 3-0.03 mg tab Take  by mouth daily.  multivitamin (ONE A DAY) tablet Take 1 Tab by mouth daily. No current facility-administered medications on file prior to visit. No Known Allergies    Past Medical History/Surgeries:    1. ORIF of right hand fracture. 2. LASIK surgery. Illnesses:  1. Chronic depression. 2. Allergic rhinitis. 3. Obesity with BMI of 41.16.  4. Asthma. Family History:  Father committed suicide at age 67. He had colon cancer. Mother is 70years of age, alive with hypertension. One sister has had cervical cancer, but is doing well. Social History:  She is . She works for a nonprofit organization. She has four children. Allergies:  None. Medications:  1. Symbicort 160/4.5, two puffs twice daily. 2. Wellbutrin  mg twice daily. 3. Birth control pill daily. 4. Lexapro 10 mg daily. 5. Flonase, two sprays in each nostril daily. Habits:  Nonsmoker and a non drinker. Review of Systems:  HEENT:  Denies any headaches, dizziness or blurred vision. She does wear glasses and does get regular eye exam.  CVR:  Denies any chest pain or palpitations. Denies any syncopal episode. Denies any ankle edema. shortness of breath, cough, wheezing, PND or orthopnea. Denies any ankle edema. GI:  Appetite is good, weight has been up in the last year. Does have a normal bowel pattern without presence of blood in stools or melena. She is in need of getting her colonoscopy. :  Denies any urinary symptoms. GYN:  She is up to date with her pelvic and pap, which she gets [facility] the [facility]. Immunizations:  She has gotten her COVID vaccination. Physical Examination:  GENERAL:  Pleasant, obese lady in no acute distress. She is alert and oriented. She answers my questions appropriately. VITALS:  Blood Pressure:  128/76. Pulse: 99. Temperature:  97.4. O2 sat:  97. HEENT:  Normocephalic, atraumatic. PERRLA, EOMI. TMs normal.  Mouth mucosa pink. Tongue midline. Pharynx normal.  NECK:  Supple without adenopathy, thyromegaly or carotid bruits. CHEST:  Lungs clear to auscultation, no rales or wheezes. CV:  Heart regular rhythm without murmur or gallop. ABDOMEN:  Soft, non tender, no organomegaly or masses. No CVA tenderness. EXTREMITIES:  No edema or calf tenderness. Distal pulses were present. NEUROLOGIC:  Cranial nerves II-XII intact. Excellent strength in the upper and lower extremities against resistance. Romberg is negative. She had excellent coordination. BACK:  No pain on percussion of the lumbar spine. She does have some mild discomfort on hyperextension and left lateral bending. SLR negative bilaterally. She had full ROM of both hips. Impression:  1. Acute low back pain. 2. Left ear tinnitus. 3. Obesity with BMI of 41.16.  4. Allergic rhinitis. 5. Asthma. 6. Chronic depression. Plan:  1. In terms of her back, I did defer x-ray since there has been no injury. However, I am going to try her on a short course of steroids. She may use heat alternating with ice. She certainly will contact us should she fail to improve. We certainly can consider a course of physical therapy and also x-rays of the lumbar spine. 2. In terms of her left ear tinnitus, I am referring her to Dr. Shivam Jones. 3. She was fasting this morning, so it was opted to do all of her lab studies. I will call her as soon as I have her results. 4. I am referring her also for colonoscopy. 5. She is encouraged to follow a prudent diet, exercise and weight loss to improve her BMI.     6. I do want to see her back in six months, sooner if there are any concerns.

## 2021-05-18 NOTE — PROGRESS NOTES
Garland Hand is a 53 y.o. female     Chief Complaint   Patient presents with    Complete Physical       Visit Vitals  /76 (BP 1 Location: Left arm, BP Patient Position: Sitting, BP Cuff Size: Adult)   Pulse 99   Temp 97.4 °F (36.3 °C) (Temporal)   Resp 18   Ht 5' 8\" (1.727 m)   Wt 270 lb 11.2 oz (122.8 kg)   SpO2 97%   BMI 41.16 kg/m²       Health Maintenance Due   Topic Date Due    Hepatitis C Screening  Never done       1. Have you been to the ER, urgent care clinic since your last visit? Hospitalized since your last visit? No    2. Have you seen or consulted any other health care providers outside of the [address] [place] [address] since your last visit? Include any pap smears or colon screening.  No

## 2021-05-20 LAB
BACTERIA SPEC CULT: NORMAL
SERVICE CMNT-IMP: NORMAL

## 2021-06-11 RX ORDER — PREDNISONE 10 MG/1
TABLET ORAL
Qty: 21 TABLET | Refills: 0 | Status: SHIPPED | OUTPATIENT
Start: 2021-06-11 | End: 2022-05-20

## 2021-07-07 DIAGNOSIS — M25.561 ACUTE PAIN OF RIGHT KNEE: Primary | ICD-10-CM

## 2021-07-07 DIAGNOSIS — M25.569 ACUTE KNEE PAIN, UNSPECIFIED LATERALITY: Primary | ICD-10-CM

## 2021-07-08 ENCOUNTER — HOSPITAL ENCOUNTER (OUTPATIENT)
Dept: GENERAL RADIOLOGY | Age: 51
Discharge: HOME | End: 2021-07-08
Payer: COMMERCIAL

## 2021-07-08 ENCOUNTER — OFFICE VISIT (OUTPATIENT)
Dept: ORTHOPEDIC SURGERY | Age: 51
End: 2021-07-08
Payer: COMMERCIAL

## 2021-07-08 VITALS
HEIGHT: 68 IN | WEIGHT: 275 LBS | TEMPERATURE: 96 F | HEART RATE: 93 BPM | OXYGEN SATURATION: 97 % | DIASTOLIC BLOOD PRESSURE: 87 MMHG | BODY MASS INDEX: 41.68 KG/M2 | SYSTOLIC BLOOD PRESSURE: 131 MMHG

## 2021-07-08 DIAGNOSIS — M17.0 BILATERAL PRIMARY OSTEOARTHRITIS OF KNEE: ICD-10-CM

## 2021-07-08 DIAGNOSIS — M17.12 UNILATERAL PRIMARY OSTEOARTHRITIS, LEFT KNEE: Primary | ICD-10-CM

## 2021-07-08 DIAGNOSIS — M25.561 ACUTE PAIN OF RIGHT KNEE: ICD-10-CM

## 2021-07-08 PROCEDURE — 99203 OFFICE O/P NEW LOW 30 MIN: CPT | Performed by: ORTHOPAEDIC SURGERY

## 2021-07-08 PROCEDURE — 73564 X-RAY EXAM KNEE 4 OR MORE: CPT

## 2021-07-08 PROCEDURE — 20610 DRAIN/INJ JOINT/BURSA W/O US: CPT | Performed by: ORTHOPAEDIC SURGERY

## 2021-07-08 PROCEDURE — 73562 X-RAY EXAM OF KNEE 3: CPT

## 2021-07-08 PROCEDURE — 73560 X-RAY EXAM OF KNEE 1 OR 2: CPT

## 2021-07-08 RX ORDER — BETAMETHASONE SODIUM PHOSPHATE AND BETAMETHASONE ACETATE 3; 3 MG/ML; MG/ML
6 INJECTION, SUSPENSION INTRA-ARTICULAR; INTRALESIONAL; INTRAMUSCULAR; SOFT TISSUE ONCE
Qty: 1 ML | Refills: 0
Start: 2021-07-08 | End: 2021-07-08

## 2021-07-08 NOTE — PROGRESS NOTES
Identified pt with two pt identifiers (name and ). Reviewed chart in preparation for visit and have obtained necessary documentation. Yuan Monique is a 49 y.o. female  Chief Complaint   Patient presents with    Knee Pain     RT knee     Visit Vitals  /87 (BP 1 Location: Right arm, BP Patient Position: Sitting, BP Cuff Size: Large adult)   Pulse 93   Temp (!) 96 °F (35.6 °C) (Tympanic)   Ht 5' 8\" (1.727 m)   Wt 275 lb (124.7 kg)   SpO2 97%   BMI 41.81 kg/m²     1. Have you been to the ER, urgent care clinic since your last visit? Hospitalized since your last visit? No    2. Have you seen or consulted any other health care providers outside of the [address] [place] [address] since your last visit? Include any pap smears or colon screening.  No

## 2021-07-08 NOTE — PROGRESS NOTES
7/8/2021    Chief Complaint: Right knee pain    HPI: This is a 49 y.o. female who complains of right knee pain. Onset was gradual.  The patient has had activity dependent pain for several months. The patient has tried activity modification, physical therapy exercises, injections have not been attempted. The pain is in the medial knee and anteriorly, moderate  in intensity. The patient feels unstable with the knee, fears falling, and has significant limitation with activities of daily living, recreation, and walks with a limp. Past Medical History:   Diagnosis Date    Allergic rhinitis 10/5/2017    Annual physical exam 10/5/2017    Bilateral foot pain 10/5/2017    Depression 10/5/2017    DJD (degenerative joint disease) of knee 10/5/2017    Left knee pain 10/5/2017    Obesities, morbid ([address] 10/5/2017    Plantar fasciitis, bilateral 10/5/2017    Raynaud's disease 10/5/2017    Screening for diabetes mellitus 10/5/2017    Screening for hyperlipidemia 10/5/2017    Screening for hypothyroidism 10/5/2017    Vitamin D deficiency 10/5/2017       Past Surgical History:   Procedure Laterality Date    HX WISDOM TEETH EXTRACTION      HX WRIST FRACTURE [address]  2005       Current Outpatient Medications on File Prior to Visit   Medication Sig Dispense Refill    predniSONE (STERAPRED DS) 10 mg dose pack Take as directed on packet 21 Tablet 0    budesonide-formoteroL (SYMBICORT) 160-4.5 mcg/actuation HFAA Take 2 Puffs by inhalation two (2) times a day. 3 Inhaler 3    escitalopram oxalate (LEXAPRO) 10 mg tablet TAKE 1 TABLET DAILY 90 Tab 3    buPROPion SR (WELLBUTRIN SR) 150 mg SR tablet TAKE 1 TABLET TWICE A  Tab 3    drospirenone-ethinyl estradioL (April, 28,) 3-0.02 mg tab APRIL (28) 3 mg-0.02 mg tablet   Take 1 tablet every day by oral route.  fluticasone (FLONASE) 50 mcg/actuation nasal spray 2 Sprays by Both Nostrils route daily.  cetirizine (ZYRTEC) 10 mg tablet Take  by mouth.       drospirenone-ethinyl estradiol (BRI, 28,) 3-0.03 mg tab Take  by mouth daily.  multivitamin (ONE A DAY) tablet Take 1 Tab by mouth daily. No current facility-administered medications on file prior to visit. No Known Allergies    Family History   Problem Relation Age of Onset    No Known Problems Mother     Colon Polyps Father     Cancer Father         esophageal    Cancer Sister        Social History     Socioeconomic History    Marital status:      Spouse name: Not on file    Number of children: Not on file    Years of education: Not on file    Highest education level: Not on file   Occupational History    Occupation: director   Tobacco Use    Smoking status: Never Smoker    Smokeless tobacco: Never Used   Vaping Use    Vaping Use: Never used   Substance and Sexual Activity    Alcohol use: Yes     Comment: occasional    Drug use: No    Sexual activity: Yes     Partners: Female     Social Determinants of Health     Financial Resource Strain:     Difficulty of Paying Living Expenses:    Food Insecurity:     Worried About Running Out of Food in the Last Year:     Ran Out of Food in the Last Year:    Transportation Needs:     Lack of Transportation (Medical):      Lack of Transportation (Non-Medical):    Physical Activity:     Days of Exercise per Week:     Minutes of Exercise per Session:    Stress:     Feeling of Stress :    Social Connections:     Frequency of Communication with Friends and Family:     Frequency of Social Gatherings with Friends and Family:     Attends Bahai Services:     Active Member of Clubs or Organizations:     Attends Club or Organization Meetings:     Marital Status:          Review of Systems:       General: Denies headache, lethargy, fever, weight loss  Ears/Nose/Throat: Denies ear discharge, drainage, nosebleeds, hoarse voice, dental problems  Cardiovascular: Denies chest pain, shortness of breath  Lungs: Denies chest pain, breathing problems, wheezing, pneumonia  Stomach: Denies stomach pain, heartburn, constipation, irritable bowel  Skin: Denies rash, sores, open wounds  Musculoskeletal: Admits to knee pain, no deformity. Genitourinary: Denies dysuria, hematuria, polyuria  Gastrointestinal: Denies constipation, obstipation, diarrhea  Neurological: Denies changes in sight, smell, hearing, taste, seizures. Denies loss of consciousness. Psychiatric: Denies depression, sleep pattern changes, anxiety, change in personality  Endocrine: Denies mood swings, heat or cold intolerance  Hematologic/Lymphatic: Denies anemia, purpura, petechia  Allergic/Immunologic: Denies swelling of throat, pain or swelling at lymph nodes      Physical Examination:    Visit Vitals  /87 (BP 1 Location: Right arm, BP Patient Position: Sitting, BP Cuff Size: Large adult)   Pulse 93   Temp (!) 96 °F (35.6 °C) (Tympanic)   Ht 5' 8\" (1.727 m)   Wt 275 lb (124.7 kg)   SpO2 97%   BMI 41.81 kg/m²        General: AOX3, no apparent distress  Psychiatric: mood and affect appropriate  Lungs: breathing is symmetric and unlabored bilaterally  Heart: regular rate and rhythm  Abdomen: no guarding  Head: normocephalic, atraumatic  Skin: No significant abnormalities, good turgor  Sensation intact to light touch: L1-S1 dermatomes  Muscular exam: 5/5 strength in all major muscle groups unless noted in specialty exam.    Extremities:      Left upper extremity: Full active and passive range of motion without pain, deformity, no open wound, strength 5/5 in all major muscle groups. Right upper extremity: Full active and passive range of motion without pain, deformity, no open wound, strength 5/5 in all major muscle groups. Left lower extremity: Full active and passive range of motion without pain, deformity, no open wound, strength 5/5 in all major muscle groups. Right lower extremity:  No deformity is noted. Range of motion of the knee is 0-110.    Ligamentous testing of the knee indicates stability of the the MCL, LCL, PCL, and ACL. Lachman's, anterior and posterior drawer tests are specifically negative. Medial joint line tenderness to palpation is noted. Popliteal area is unremarkable. 1+  for effusion. + patellar crepitus. Patella tracks centrally. Pivot shift is negative. Strength testing is indicative of 5/5 strength at hip flexion, extension, knee flexion and extension, tibialis anterior, EHL, and FHL. Sensation is intact to light touch in the L1-S1 dermatomes. Capillary refill is less than 2 seconds in the toes. Diagnostics:    Pertinent Diagnostics:  Xrays are available of the right knee, they indicate mild medial and patellofemoral osteoarthritis of the knee joint, no significant other findings, no other osseus abnormalities, fractures, or dislocations. Assessment: Osteoarthritis right knee    Plan: This patient and I did discuss the many options in treating knee osteoarthritis. We did discuss that we could continue to seek out nonoperative modalities, such as: NSAIDs, oral and topical analgesics, knee injections, knee braces, physical therapy, stretching, strengthening, and weight loss strategies, activity modification, ambulatory assistive devices. The patient stated their understanding with this, but and would like to proceed with nonsurgical management in the form of injections, nsaids, weight loss, nutrition referral.      Procedures: Date of Procedure: 7/8/2021  PROCEDURE NOTE: Right knee injection of Celestone    Consent was obtained from the patient. The correct site was identified after confirmation with the patient. Following identification and confirmation of the correct site with the patient, the superolateral right knee was prepped in the normal sterile fashion. A local anesthetic of 1% lidocaine without epinephrine was then administered to the local tissues.   Following, an injection of a mixture of  6 mg Celestone and 1% lidocaine without epinephrine was administered to the right knee. The needle was then withdrawn and the injection site dressed with a sterile bandage at the conclusion. The procedure was well tolerated by the patient. No immediate adverse reactions were noted. Post injection instructions were given. Ms. Cheikh Baeza has a reminder for a \"due or due soon\" health maintenance. I have asked that she contact her primary care provider for follow-up on this health maintenance.

## 2021-07-08 NOTE — LETTER
7/8/2021    Patient: Krystal Skinner   YOB: 1970   Date of Visit: 7/8/2021     [address]  [address] [address]  Via In Basket    Dear Aneudy Martini NP,      Thank you for referring Ms. Danielle Walker to [facility] for evaluation. My notes for this consultation are attached. If you have questions, please do not hesitate to call me. I look forward to following your patient along with you.       Sincerely,    Geoff Kinds, DO

## 2021-07-14 ENCOUNTER — HOSPITAL ENCOUNTER (OUTPATIENT)
Dept: NUTRITION | Age: 51
Discharge: HOME | End: 2021-07-14
Payer: COMMERCIAL

## 2021-07-14 DIAGNOSIS — E66.01 OBESITIES, MORBID (HCC): ICD-10-CM

## 2021-07-14 PROCEDURE — 97802 MEDICAL NUTRITION INDIV IN: CPT | Performed by: DIETITIAN, REGISTERED

## 2021-07-14 NOTE — PROGRESS NOTES
[address] [facility]     Nutrition Assessment - Medical Nutrition Therapy   Outpatient Initial Evaluation         Patient Name: Eugenio Moreira : 1970   Treatment Diagnosis: Z68.41 (ICD-10-CM) - BMI 40.0-44.9, adult [facility])   Referral Source: Bia Alonso Start of Care ([facility]): 2021     In time:   255pm             Out time:   355pm   Total Treatment Time (min):   60     Gender: female Age: 49 y.o. Ht: 68 in Wt:  279.2 lb 126.6 kg   BMI: 42.5 (Morbid Obesity) BMR   Male  BMR Female 1915     Past Medical History:  Patient Active Problem List   Diagnosis Code    Depression F32.9    Vitamin D deficiency E55.9    Allergic rhinitis J30.9    Bilateral foot pain M79.671, M79.672    Raynaud's disease I73.00    Left knee pain M25.562    Plantar fasciitis, bilateral M72.2    Obesities, morbid ([address] E66.01    DJD (degenerative joint disease) of knee M17.10        Pertinent Medications:     Current Outpatient Medications:     predniSONE (STERAPRED DS) 10 mg dose pack, Take as directed on packet, Disp: 21 Tablet, Rfl: 0    budesonide-formoteroL (SYMBICORT) 160-4.5 mcg/actuation HFAA, Take 2 Puffs by inhalation two (2) times a day., Disp: 3 Inhaler, Rfl: 3    escitalopram oxalate (LEXAPRO) 10 mg tablet, TAKE 1 TABLET DAILY, Disp: 90 Tab, Rfl: 3    buPROPion SR (WELLBUTRIN SR) 150 mg SR tablet, TAKE 1 TABLET TWICE A DAY, Disp: 180 Tab, Rfl: 3    drospirenone-ethinyl estradioL (April, 28,) 3-0.02 mg tab, APRIL (28) 3 mg-0.02 mg tablet  Take 1 tablet every day by oral route., Disp: , Rfl:     cetirizine (ZYRTEC) 10 mg tablet, Take  by mouth., Disp: , Rfl:     drospirenone-ethinyl estradiol (BRI, 28,) 3-0.03 mg tab, Take  by mouth daily. , Disp: , Rfl:     multivitamin (ONE A DAY) tablet, Take 1 Tab by mouth daily. , Disp: , Rfl:     fluticasone (FLONASE) 50 mcg/actuation nasal spray, 2 Sprays by Both Nostrils route daily. , Disp: , Rfl:      Biochemical Data: Lab Results   Component Value Date/Time    Hemoglobin A1c 5.6 05/18/2021 09:55 AM     Lab Results   Component Value Date/Time    Sodium 139 05/18/2021 09:55 AM    Potassium 4.3 05/18/2021 09:55 AM    Chloride 106 05/18/2021 09:55 AM    CO2 24 05/18/2021 09:55 AM    Anion gap 9 05/18/2021 09:55 AM    Glucose 93 05/18/2021 09:55 AM    BUN 12 05/18/2021 09:55 AM    Creatinine 0.77 05/18/2021 09:55 AM    BUN/Creatinine ratio 16 05/18/2021 09:55 AM    GFR est AA >60 05/18/2021 09:55 AM    GFR est non-AA >60 05/18/2021 09:55 AM    Calcium 9.3 05/18/2021 09:55 AM    Bilirubin, total 0.3 05/18/2021 09:55 AM    Alk. phosphatase 83 05/18/2021 09:55 AM    Protein, total 7.5 05/18/2021 09:55 AM    Albumin 3.7 05/18/2021 09:55 AM    Globulin 3.8 05/18/2021 09:55 AM    A-G Ratio 1.0 (L) 05/18/2021 09:55 AM    ALT (SGPT) 19 05/18/2021 09:55 AM    AST (SGOT) 11 (L) 05/18/2021 09:55 AM     Lab Results   Component Value Date/Time    Cholesterol, total 170 05/18/2021 09:55 AM    HDL Cholesterol 61 05/18/2021 09:55 AM    LDL, calculated 78.6 05/18/2021 09:55 AM    VLDL, calculated 30.4 05/18/2021 09:55 AM    Triglyceride 152 (H) 05/18/2021 09:55 AM    CHOL/HDL Ratio 2.8 05/18/2021 09:55 AM        Assessment:   Pt is a 48 yo female, seen today for weight loss and nutrition guidance for healthy eating and lifestyle changes. Pt has made many attempts of weight loss over the past years, trying juicing and other programs and supplements with some success. Pt reports that she has been doing CBT w/ a therapist to work on changes around self-image. Pt has recently been motivated towards weight loss d/t osteoarthritis diagnosis in both knees. Pt does not have a current exercise routine and is sedentary in her work. Pt splits the food shopping and preparation responsibilities with her spouse. Pt reports that she has 4 children and her family is supportive of her nutrition changes.  Pt goals include learning how to eat a properly balanced diet and feel good in her clothes. Food & Nutrition: B- 8am Coffee w/ [employer] protein shake, topped w/ corina whip   S- none   L- 10:30-11am [place]; PBJ; Leftovers from dinner  S- 3 pm  Potato chips or pretzels  D- 4-5pm  Tacos; pasta; burgers; pizza   S-  snacking at night creates temptation to eat, otherwise does not have a snack at night. Drinks: Water, [place] water, rarely soda    Meals out: 2-3x week; Gi[employer]ani[employer] (pizza), Ricos ([place]), [employer]      Exercise: none, sedentary job     Other:   GERD - btw lunch and following dinner, acidic foods like tomato based sauces, pepper and spicy foods, going without eating for longer periods of time   Asked about IF but noted it not working d/t medication schedule. - RD suggested setting a time frame of eating between 8am and 8pm to help set a boundary for not eating late at night. Estimate Needs:    Equation( [x] MSJ ; []  HBE; [] [place]ak; [] other)  * Activity Factor (1.2) -500   Calories: 1800  Protein: 113 Carbs: 203 Fat: 60   Kcal/day  g/day  g/day  g/day       Pro 0.8g/kg= 100g/d percent: 25  45  30               Nutrition Diagnosis Obesity R/T excessive energy intake AEB BMI >40, dietary recall showing high calorie intake, and eating at night. Nutrition Intervention &  Education: Educated pt on nutritional needs for healthy weight loss and recommendations for each of the food groups. Created a meal guide using the meal builder with servings list.   Discussed ways to assess for health other than weight to determine success, including energy level and clothing fit. Educated on common triggers for GERD and recommendations around increase fruit and vegetable intake and avoiding laying down within 30 min after consuming food. Reviewed exercise recommendations and types of exercise that are beneficial and appropriate for pt with knee pain.     Handouts Provided: []  Carbohydrates  []  Protein  [x]  Non-starchy Vegatbles  []  Food Label  [] Meal and Snack Ideas  []  Food Journals []  Diabetes  []  Cholesterol  []  Sodium  []  Gen Nutr Guidelines  []  SBGM Guidelines  [x]  Others: Meal Builder Packet   Information Reviewed with: Pt    Readiness to Change Stage: []  Pre-contemplative    []  Contemplative  [x]  Preparation               []  Action                  []  Maintenance   Potential Barriers to Learning: []  Decline in memory    []  Language barrier   []  Other:  []  Emotional                  [x]  Limited mobility     []  None  []  Lack of motivation     [] Vision, hearing or cognitive impairment   Expected Compliance: Pt expressed comprehension, high motivation, and compliance is expected. Nutritional Goal - To promote lifestyle changes to result in:    [x]  Weight loss  []  Improved diabetic control  []  Decreased cholesterol levels  []  Decreased blood pressure  []  Weight maintenance []  Preventing any interactions associated with food allergies  []  Adequate weight gain toward goal weight  []  Other:        Patient Goals:   - Use the meal builder for 3 meal and 2-3 snacks composed of 8 CHO choices, 5 Protein choices, 5 fats choices, and unlimited non-starchy vegetables each day. - Improve physical activity w/goal to walk for 30 minutes 2-3 times per week. - Increase accountability and awareness of food choices by recording food and beverage intake by using a food journal at least 3 days per week.      Dietitian Signature: Sabina Pineda RDN Date: 7/14/2021   Follow-up: 2 weeks Time: 2:47 PM

## 2021-07-16 ENCOUNTER — VIRTUAL VISIT (OUTPATIENT)
Dept: ORTHOPEDIC SURGERY | Age: 51
End: 2021-07-16
Payer: COMMERCIAL

## 2021-07-16 DIAGNOSIS — M17.0 BILATERAL PRIMARY OSTEOARTHRITIS OF KNEE: ICD-10-CM

## 2021-07-16 DIAGNOSIS — M17.12 UNILATERAL PRIMARY OSTEOARTHRITIS, LEFT KNEE: Primary | ICD-10-CM

## 2021-07-16 PROCEDURE — 99441 PR PHYS/QHP TELEPHONE EVALUATION 5-10 MIN: CPT | Performed by: ORTHOPAEDIC SURGERY

## 2021-07-16 NOTE — PROGRESS NOTES
7/16/2021      CC: left knee pain    HPI:      This is a 49y.o. year old female who presents for follow up of their left knee injection. The patient states that they have had very good relief of their pain. The time since injection has been one week. PMH:  Past Medical History:   Diagnosis Date    Allergic rhinitis 10/5/2017    Annual physical exam 10/5/2017    Bilateral foot pain 10/5/2017    Depression 10/5/2017    DJD (degenerative joint disease) of knee 10/5/2017    Left knee pain 10/5/2017    Obesities, morbid ([address] 10/5/2017    Plantar fasciitis, bilateral 10/5/2017    Raynaud's disease 10/5/2017    Screening for diabetes mellitus 10/5/2017    Screening for hyperlipidemia 10/5/2017    Screening for hypothyroidism 10/5/2017    Vitamin D deficiency 10/5/2017       PSxHx:  Past Surgical History:   Procedure Laterality Date    HX WISDOM TEETH EXTRACTION      [place] WRIST FRACTURE [place]  2005       Meds:    Current Outpatient Medications:     predniSONE (STERAPRED DS) 10 mg dose pack, Take as directed on packet, Disp: 21 Tablet, Rfl: 0    budesonide-formoteroL (SYMBICORT) 160-4.5 mcg/actuation HFAA, Take 2 Puffs by inhalation two (2) times a day., Disp: 3 Inhaler, Rfl: 3    escitalopram oxalate (LEXAPRO) 10 mg tablet, TAKE 1 TABLET DAILY, Disp: 90 Tab, Rfl: 3    buPROPion SR (WELLBUTRIN SR) 150 mg SR tablet, TAKE 1 TABLET TWICE A DAY, Disp: 180 Tab, Rfl: 3    drospirenone-ethinyl estradioL (April, 28,) 3-0.02 mg tab, APRIL (28) 3 mg-0.02 mg tablet  Take 1 tablet every day by oral route., Disp: , Rfl:     cetirizine (ZYRTEC) 10 mg tablet, Take  by mouth., Disp: , Rfl:     drospirenone-ethinyl estradiol (BRI, 28,) 3-0.03 mg tab, Take  by mouth daily. , Disp: , Rfl:     multivitamin (ONE A DAY) tablet, Take 1 Tab by mouth daily. , Disp: , Rfl:     fluticasone (FLONASE) 50 mcg/actuation nasal spray, 2 Sprays by Both Nostrils route daily. , Disp: , Rfl:     All:  No Known Allergies    Social Hx:  Social History     Socioeconomic History    Marital status:      Spouse name: Not on file    Number of children: Not on file    Years of education: Not on file    Highest education level: Not on file   Occupational History    Occupation: director   Tobacco Use    Smoking status: Never Smoker    Smokeless tobacco: Never Used   Vaping Use    Vaping Use: Never used   Substance and Sexual Activity    Alcohol use: Yes     Comment: occasional    Drug use: No    Sexual activity: Yes     Partners: Female     Social Determinants of Health     Financial Resource Strain:     Difficulty of Paying Living Expenses:    Food Insecurity:     Worried About Running Out of Food in the Last Year:     [address] in the Last Year:    Transportation Needs:     Lack of Transportation (Medical):      Lack of Transportation (Non-Medical):    Physical Activity:     Days of Exercise per Week:     Minutes of Exercise per Session:    Stress:     Feeling of Stress :    Social Connections:     Frequency of Communication with Friends and Family:     Frequency of Social Gatherings with Friends and Family:     Attends [employer] Services:     Active Member of Clubs or Organizations:     Attends Club or Organization Meetings:     Marital Status:        Family Hx:  Family History   Problem Relation Age of Onset    No Known Problems Mother     Colon Polyps Father     Cancer Father         esophageal    Cancer Sister          Review of Systems:       General: Denies headache, lethargy, fever, weight loss  Ears/Nose/Throat: Denies ear discharge, drainage, nosebleeds, hoarse voice, dental problems  Cardiovascular: Denies chest pain, shortness of breath  Lungs: Denies chest pain, breathing problems, wheezing, pneumonia  Stomach: Denies stomach pain, heartburn, constipation, irritable bowel  Skin: Denies rash, sores, open wounds  Musculoskeletal: left knee pain, resolving  Genitourinary: Denies dysuria, hematuria, polyuria  Gastrointestinal: Denies constipation, obstipation, diarrhea  Neurological: Denies changes in sight, smell, hearing, taste, seizures. Denies loss of consciousness. Psychiatric: Denies depression, sleep pattern changes, anxiety, change in personality  Endocrine: Denies mood swings, heat or cold intolerance  Hematologic/Lymphatic: Denies anemia, purpura, petechia  Allergic/Immunologic: Denies swelling of throat, pain or swelling at lymph nodes      Physical Examination:    There were no vitals taken for this visit. General: AOX3, no apparent distress  Psychiatric: mood and affect appropriate        Diagnostics:    Pertinent Diagnostics: none    Assessment: Status post left knee injection  Plan: This patient and I discussed the normal course for injections, we discussed that pain relief will likely be temporary to some degree, but I cannot predict the longevity of relief. We also discussed the limitations of injections, and that I cannot inject the same area any more often than every three months. We will proceed with weight loss, exercises, and conservative management. Patient in [place] at the time of consultation. I was in the office while conducting this encounter. Consent:  She and/or her healthcare decision maker is aware that this patient-initiated Telehealth encounter is a billable service, with coverage as determined by her insurance carrier. She is aware that she may receive a bill and has provided verbal consent to proceed: Yes    This virtual visit was conducted telephone encounter only. -  I affirm this is a Patient Initiated Episode with an Established Patient who has not had a related appointment within my department in the past 7 days or scheduled within the next 24 hours. Note: this encounter is not billable if this call serves to triage the patient into an appointment for the relevant concern. Total Time: minutes: 5-10 minutes.           Ms. Cheikh Baeza has a reminder for a \"due or due soon\" health maintenance. I have asked that she contact her primary care provider for follow-up on this health maintenance.

## 2021-08-30 ENCOUNTER — HOSPITAL ENCOUNTER (OUTPATIENT)
Dept: NUTRITION | Age: 51
Discharge: HOME | End: 2021-08-30
Payer: COMMERCIAL

## 2021-08-30 PROCEDURE — 97803 MED NUTRITION INDIV SUBSEQ: CPT | Performed by: DIETITIAN, REGISTERED

## 2021-09-16 ENCOUNTER — HOSPITAL ENCOUNTER (OUTPATIENT)
Dept: NUTRITION | Age: 51
Discharge: HOME | End: 2021-09-16
Payer: COMMERCIAL

## 2021-09-16 PROCEDURE — 97803 MED NUTRITION INDIV SUBSEQ: CPT | Performed by: DIETITIAN, REGISTERED

## 2021-09-16 NOTE — PROGRESS NOTES
NUTRITION - FOLLOW-UP TREATMENT NOTE  Patient Name: Wyatt Hernandez         Date: 2021  : 1970    YES Patient  Verified  Diagnosis: Z68.41 (ICD-10-CM) - BMI 40.0-44.9, adult ([facility] [address]   In time:   330PM             Out time:   400PM   Total Treatment Time (min):   30     SUBJECTIVE/ASSESSMENT  Current Wt: 279.8 Previous Wt: 280.2 Wt Change: -0.4     Initial Wt: 279.2 Total Wt change: +0.6 Height: 68     Changes in medication or medical history? Any new allergies, surgeries or procedures? NO    If yes, update Summary List        Nutrition Diagnosis        Diagnosis Status: Obesity R/T excessive energy intake AEB BMI >40, dietary recall showing high calorie intake, and eating at night.    []  Improved []  No Change    [x]  Declined   []  Discontinued        Nutrition Monitoring and Evaluation: Pt seen today for follow-up visit, pt is still feeling as though she is not making any improvements. Pt notes that she has been mindful of eating less junk food and drinking good amounts of water. The challenges still remain snacking in the afternoons and being tempted by the foods that family members have around like chocolate and cookies. Discussed having alternative sweets like outshine bars and fiber one brownies. Pt has not been sleeping well due to constant thoughts as she is laying in bed at night. We discussed keeping a notepad at the bedside to write down thoughts to see if this would clear her mind and allow for better sleep. Pt has been tracking some days and finds it overwhelming to focus on the macro nutrient breakdown. RD encouraged pt to simply look at the calories and getting balanced meals in the day. Pt has still not gotten the motivation to go walking but knows that it is something that she wants to do. Previous Goals:   - Find the \"why\" behind wanting to lose weight.   - Add vegetables with lunch and dinner meals each day  - Continue tracking foods in [employer] keyona   - Walk 3x per week for 30 minutes; use headphones for music of entertainment while walking. Nutrition Prescription and  Intervention Educated pt on nutritional needs for healthy weight loss and recommendations for each of the food groups. Created a meal guide using the meal builder with servings list.   Discussed ways to assess for health other than weight to determine success, including energy level and clothing fit. Educated on common triggers for GERD and recommendations around increase fruit and vegetable intake and avoiding laying down within 30 min after consuming food. Reviewed exercise recommendations and types of exercise that are beneficial and appropriate for pt with knee pain. CBT   Self monitoring  Goal Setting      Patient Education:  [x]  Review current plan with patient   []  Other:    Handouts/  Information Provided: []  Carbohydrates  []  Protein  []  Fiber  []  Serving Sizes  []  Fluids  []  General guidelines []  Diabetes  []  Cholesterol  []  Sodium  []  SBGM  []  Food Journals  []  Others:      Patient Goals - Track just calories and portion sizes. - Limit treat foods to days that you go walking   - Write out meal plan for the week  - Keep a note pad next to the bed to write down wandering thoughts.         PLAN  [x]  Continue on current plan []  Follow-up PRN   []  Discharge due to :    [x]  Next appt: 2 weeks     Dietitian: Tim Hollingsworth RDN    Date: 9/16/2021 Time: 400 PM

## 2021-09-30 ENCOUNTER — APPOINTMENT (OUTPATIENT)
Dept: NUTRITION | Age: 51
End: 2021-09-30
Payer: COMMERCIAL

## 2021-10-07 ENCOUNTER — HOSPITAL ENCOUNTER (OUTPATIENT)
Dept: NUTRITION | Age: 51
Discharge: HOME | End: 2021-10-07
Payer: COMMERCIAL

## 2021-10-07 PROCEDURE — 97803 MED NUTRITION INDIV SUBSEQ: CPT | Performed by: DIETITIAN, REGISTERED

## 2021-10-07 NOTE — PROGRESS NOTES
Sage Sexton was informed of the inherent limitations of a virtual visit,  and has consented to a virtual therapy visit on 10/7/2021. Information regarding emergency contact information for this patient during this visit is to contact:  Bentley Yarbrough at [phone] in addition to calling 911. The patient was informed that at any time during the virtual visit, they can decide to stop the virtual visit. The patient verified that they are physically located in the [facility] for this virtual visit. NUTRITION - FOLLOW-UP TREATMENT NOTE  Patient Name: Sage Sexton         Date: 10/7/2021  : 1970    YES Patient  Verified  Diagnosis: Z68.41 (ICD-10-CM) - BMI 40.0-44.9, adult [facility]   In time:   9:30am             Out time:   10:00am   Total Treatment Time (min):   30     SUBJECTIVE/ASSESSMENT  Current Wt: 276.2 (home) Previous Wt: 279.8 (office) Wt Change: NA     Initial Wt: 279.2 (office) Total Wt change: NA Height: 68     Changes in medication or medical history? Any new allergies, surgeries or procedures? YES/   If yes, update Summary List   Positive for COVID as of   Quarantined with adult children at home. Loss of taste and smell     Nutrition Diagnosis        Diagnosis Status: New: Physical inactivity R/T low motivation and low energy secondary to COVID illness AEB report of no exercise plan and lack of motivation some days    New: Inadequate energy intake R/T low appetite secondary to COVID infection AEB dietary recall    Obesity R/T excessive energy intake AEB BMI >40, dietary recall showing high calorie intake, and eating at night.    []  Improved []  No Change    []  Declined   []  Discontinued -*ON HOLD        Nutrition Monitoring and Evaluation:  Currently quarantined with COVID. increased takeout consumed. Working to make more food at home. Loss of taste and smell. Lower intake for past week. Sleeping most of the day.      Prior to illness Focusing on calories instead of macronutrient targets has decreased stress. More aware of her food choices compared to before. Prior to getting COVID was moving more. Some days feeling up to go walking now. Not currently tracking    B- protein shake  S- none  L- sargento break box  S- none  D- pancakes  S- less due to loss of taste  Drinks:     Wants to make better snack choices. Feels unsure about what is a healthier choice. Some stress around eating foods that are not allowed on optivia. Previous goals:  Met. More aware of choices. Currently low intake due to COVID illness- Track just calories and portion sizes. NA - Limit treat foods to days that you go walking   Not met. - Write out meal plan for the week  Not addressed- Keep a note pad next to the bed to write down wandering thoughts.         Nutrition Prescription and  Intervention Reviewed meal builder and definitions of \"healthy eating\". Simplified \"healthy\" meal by focusing on including multiple components like protein, vegetables, and complex carb sources. Portions to be based on hunger at this time vs following a specific plan. Self monitoring with Myfitness Pal log  Stress management  CBT  Redefining success and monitoring progress with measures other than the scale. Focused on behavior changes instead of weight. Patient Education:  [x]  Review current plan with patient   []  Other:    Handouts/  Information Provided: []  Carbohydrates  []  Protein  []  Fiber  []  Serving Sizes  []  Fluids  []  General guidelines []  Diabetes  []  Cholesterol  []  Sodium  []  SBGM  []  Food Journals  []  Others:      Patient Goals - go for a walk when feeling up to it aiming for at least 1 time per week  -include a protein, starch, and vegetable at lunch and dinner  -keep a food log using myfitness pal for awareness of food choices. Return to daily.       PLAN  [x]  Continue on current plan []  Follow-up PRN   []  Discharge due to :    [x]  Next appt: 2 weeks     Dietitian: Jackie Wallace MS, EDWARD, CSSD    Date: 10/7/2021 Time: 9:32 AM     Claudene Mast is a 49 y.o. female being evaluated by a Virtual Visit (video visit) encounter to address concerns as mentioned above. A caregiver was present when appropriate. Due to this being a TeleHealth encounter (During QBOET-74 public health emergency), evaluation of the following areas was limited: Nutrition Focused Physical Exam. Pursuant to the emergency declaration under the [address] [place] [address], [address] [place] [address] authority and the [employer] and [employer] Act, this Virtual Visit was conducted with patient's (and/or legal guardian's) consent, to reduce the risk of exposure to COVID-19 and provide necessary medical care. Services were provided through a video synchronous discussion virtually to substitute for in-person encounter. --Ernst Forbes on 10/7/2021 at 9:32 AM    An electronic signature was used to authenticate this note.

## 2021-10-21 ENCOUNTER — HOSPITAL ENCOUNTER (OUTPATIENT)
Dept: NUTRITION | Age: 51
Discharge: HOME | End: 2021-10-21
Payer: COMMERCIAL

## 2021-10-21 PROCEDURE — 97803 MED NUTRITION INDIV SUBSEQ: CPT | Performed by: DIETITIAN, REGISTERED

## 2021-10-21 NOTE — PROGRESS NOTES
Ricardo Johnson was informed of the inherent limitations of a virtual visit,  and has consented to a virtual therapy visit on 10/21/2021. Information regarding emergency contact information for this patient during this visit is to contact:  Kita Garcia at [phone] in addition to calling 911. The patient was informed that at any time during the virtual visit, they can decide to stop the virtual visit. The patient verified that they are physically located in the [facility] for this virtual visit. NUTRITION - FOLLOW-UP TREATMENT NOTE  Patient Name: Ricardo Johnson         Date: 10/21/2021  : 1970    YES Patient  Verified  Diagnosis: Z68.41 (ICD-10-CM) - BMI 40.0-44.9, adult ([facility] [address]   In time:   9:00am             Out time:   9:30am   Total Treatment Time (min):   30     SUBJECTIVE/ASSESSMENT  Current Wt: 276.0 (home) Previous Wt: 276.2 (home) Wt Change: -0.2     Initial Wt: 279.2 (office) Total Wt change: -0.2 (home scale) Height: 68     Changes in medication or medical history? Any new allergies, surgeries or procedures? YES/   If yes, update Summary List   Feeling better post covid diagnosis  Still has not fully recovered sense of taste.       Nutrition Diagnosis        Diagnosis Status: Physical inactivity R/T low motivation and low energy secondary to COVID illness AEB report of no exercise plan and lack of motivation some days  [x]  Improved []  No Change    []  Declined   []  Discontinued       Inadequate energy intake R/T low appetite secondary to COVID infection AEB dietary recall  [x]  Improved []  No Change    []  Declined   []  Discontinued       Obesity R/T excessive energy intake AEB BMI >40, dietary recall showing high calorie intake, and eating at night.    []  Improved []  No Change    []  Declined   []  Discontinued -*ON HOLD        Nutrition Monitoring and Evaluation: BP Readings from Last 3 Encounters:   21 131/87   21 128/76   20 128/90     Walking daily with neighbor 30-45min. Sense of taste is not back. Notes this has helped her to avoid the snacking as they don't satisfy cravings. Pt notes still struggling with thoughts around using optivia program again. Continues to use optivia protein shake with breakfast. Supplementing protein intake with premier shake. Less snacking. Dinner with the family. Vegetables at dinner. 2 fruits per day. Not currently measuring out her food. Previous goals:  Met. - go for a walk when feeling up to it aiming for at least 1 time per week  Met. -include a protein, starch, and vegetable at lunch and dinner  Met. But not measuring. -keep a food log using myfitness pal for awareness of food choices. Return to daily.         Nutrition Prescription and  Intervention Self monitoring with [employer] Pal log. Set goals for measuring portions being consumed instead of eyeballing. Discussed optoins for using optivia. At this time pt wants to focus on practicing skills of choosing portions, measuring food, and incresing exercise (skills that would be required for after finishing optivia program)  CBT  Redefining success and monitoring progress with measures other than the scale. Focused on behavior changes instead of weight. Exercise goals - recommend adding in strength training. Patient Education:  [x]  Review current plan with patient   []  Other:    Handouts/  Information Provided: []  Carbohydrates  []  Protein  []  Fiber  []  Serving Sizes  []  Fluids  []  General guidelines []  Diabetes  []  Cholesterol  []  Sodium  []  SBGM  []  Food Journals  []  Others:      Patient Goals - walking 1 time per day 30-45min.   -measure out food choices when eating.   -keep a food log using myfitness pal for awareness of food choices. Return to daily.       PLAN  [x]  Continue on current plan []  Follow-up PRN   []  Discharge due to :    [x]  Next appt: 2 weeks     Dietitian: Kit Lopes MS, RD, CSSD    Date: 10/21/2021 Time: 9:32 PIERRE Harrison is a 49 y.o. female being evaluated by a Virtual Visit (video visit) encounter to address concerns as mentioned above. A caregiver was present when appropriate. Due to this being a TeleHealth encounter (During [facility] public [facility]), evaluation of the following areas was limited: Nutrition Focused Physical Exam. Pursuant to the emergency declaration under the [address] [place] [address], [address] [place] [address] and the [employer] and [employer] Act, this Virtual Visit was conducted with patient's (and/or legal guardian's) consent, to reduce the risk of exposure to COVID-19 and provide necessary medical care. Services were provided through a video synchronous discussion virtually to substitute for in-person encounter. --Martha Deleon on 10/21/2021 at 9:32 AM    An electronic signature was used to authenticate this note.

## 2021-10-31 RX ORDER — BUPROPION HYDROCHLORIDE 150 MG/1
TABLET, EXTENDED RELEASE ORAL
Qty: 180 TABLET | Refills: 3 | Status: SHIPPED | OUTPATIENT
Start: 2021-10-31 | End: 2022-05-20 | Stop reason: SDUPTHER

## 2021-12-02 ENCOUNTER — HOSPITAL ENCOUNTER (OUTPATIENT)
Dept: NUTRITION | Age: 51
Discharge: HOME | End: 2021-12-02
Payer: COMMERCIAL

## 2021-12-02 PROCEDURE — 97803 MED NUTRITION INDIV SUBSEQ: CPT | Performed by: DIETITIAN, REGISTERED

## 2021-12-02 NOTE — PROGRESS NOTES
Patti Duckworth was informed of the inherent limitations of a virtual visit,  and has consented to a virtual therapy visit on 2021. Information regarding emergency contact information for this patient during this visit is to contact:  Clyde Simons at [phone] in addition to calling 911. The patient was informed that at any time during the virtual visit, they can decide to stop the virtual visit. The patient verified that they are physically located in the [facility] for this virtual visit. NUTRITION - FOLLOW-UP TREATMENT NOTE  Patient Name: Patti Duckworth         Date: 2021  : 1970    YES Patient  Verified  Diagnosis: Z68.41 (ICD-10-CM) - BMI 40.0-44.9, adult ([facility] [address]   In time:   3:35pm        Out time: 4:00pm   Total Treatment Time (min):   25     SUBJECTIVE/ASSESSMENT  Current Wt: 276 (home) Previous Wt: 274.6 (home) Wt Change: +1.4     Initial Wt: 279.2 (office) Total Wt change: 0(home scale) Height: 68     Changes in medication or medical history? Any new allergies, surgeries or procedures?    no   If yes, update Summary List        Nutrition Diagnosis        Diagnosis Status: Physical inactivity R/T low motivation and low energy secondary to COVID illness AEB report of no exercise plan and lack of motivation some days  []  Improved []  No Change    [x]  Declined   []  Discontinued       Inadequate energy intake R/T low appetite secondary to COVID infection AEB dietary recall  [x]  Improved []  No Change    []  Declined   []  Discontinued       Obesity R/T excessive energy intake AEB BMI >40, dietary recall showing high calorie intake, and eating at night.    []  Improved [x]  No Change    []  Declined   []  Discontinued         Nutrition Monitoring and Evaluation: Inconsistent with tracking. High stress recently. Does not think she is getting in 1800 kcal per day. Less walking since going into the office. Eating out less often. Cooking more at home.  Getting bored with food options at home. Family is helping with planning meals ahead. improved food choices over thanksgiving compared to last year. No concerns for mellissa. Aware of food modifications she is able to make. Previous goals:   Not met. reviewed- walking 1 time per day 30-45min. Less consisent. reviewed-measure out food choices when eating. Not met. reviewed-keep a food log using [employer] pal for awareness of food choices. Return to daily. Met. -plan out meals for the week. Use [employer] pal to build recipes  Improved. -have fruit for a dessert.         Nutrition Prescription and  Intervention Self monitoring with [employer] Pal log. Set goals for measuring portions being consumed instead of eyeballing. educatd on how to use recipe tool on [employer] Pal.   Redefining success and monitoring progress with measures other than the scale. Focused on behavior changes instead of weight. Exercise goals - recommend adding in strength training. Reviewed plans for holidays and potential barriers to success     Patient Education:  [x]  Review current plan with patient   []  Other:    Handouts/  Information Provided: []  Carbohydrates  []  Protein  []  Fiber  []  Serving Sizes  []  Fluids  []  General guidelines []  Diabetes  []  Cholesterol  []  Sodium  []  SBGM  []  Food Journals  []  Others:      Patient Goals -do workout dance video tomorrow at [address] [place] [address] before daughter gets home.   -resume walking 3 days per week. Minimum time is 45min.   -keep a food log using myfitness pal for awareness of food choices. Return to daily. -measure out food choices when eating.   -plan out meals for the week. Use myfitness pal to build recipes. Ask for kids to help pick some meals to reduce food boredom.         PLAN  [x]  Continue on current plan []  Follow-up PRN   []  Discharge due to :    [x]  Next appt: 2 weeks     Dietitian: Elly Cortes MS, RD, CSSD    Date: 12/2/2021 Time: Paxton Oviedo is a 49 y.o. female being evaluated by a Virtual Visit (video visit) encounter to address concerns as mentioned above. A caregiver was present when appropriate. Due to this being a TeleHealth encounter (During WCYXZ-03 public health emergency), evaluation of the following areas was limited: Nutrition Focused Physical Exam. Pursuant to the emergency declaration under the [address] [place] [address] and the [employer] and [employer] Act, this Virtual Visit was conducted with patient's (and/or legal guardian's) consent, to reduce the risk of exposure to COVID-19 and provide necessary medical care. Services were provided through a video synchronous discussion virtually to substitute for in-person encounter. --Martha Deleon on 12/2/2021 at 4pm    An electronic signature was used to authenticate this note.

## 2021-12-27 ENCOUNTER — HOSPITAL ENCOUNTER (OUTPATIENT)
Dept: NUTRITION | Age: 51
Discharge: HOME | End: 2021-12-27
Payer: COMMERCIAL

## 2021-12-27 PROCEDURE — 97803 MED NUTRITION INDIV SUBSEQ: CPT | Performed by: DIETITIAN, REGISTERED

## 2021-12-27 NOTE — PROGRESS NOTES
Charity Francis was informed of the inherent limitations of a virtual visit,  and has consented to a virtual therapy visit on 2021. Information regarding emergency contact information for this patient during this visit is to contact:  Brandon Solitario at [phone] in addition to calling 911. The patient was informed that at any time during the virtual visit, they can decide to stop the virtual visit. The patient verified that they are physically located in the [facility] for this virtual visit. NUTRITION - FOLLOW-UP TREATMENT NOTE  Patient Name: Charity Francis         Date: 2021  : 1970    YES Patient  Verified  Diagnosis: Z68.41 (ICD-10-CM) - BMI 40.0-44.9, adult ([facility] [address]   In time:   12:35pm       Out time:  1:00pm   Total Treatment Time (min):   25     SUBJECTIVE/ASSESSMENT  Current Wt: 279.3 (home) Previous Wt: 277.6 (home) Wt Change: +1.7     Initial Wt: 279.2 (office) Total Wt change: 0(home scale) Height: 68     Changes in medication or medical history? Any new allergies, surgeries or procedures?    no   If yes, update Summary List        Nutrition Diagnosis        Diagnosis Status: New: undesirable food choices R/T culure of overeating and history of [employer] AEB pt report of eating chips and sweets for sole purpose to not waste it. [x]  Improved []  No Change    []  Declined   []  Discontinued       Physical inactivity R/T low motivation and low energy secondary to COVID illness AEB report of no exercise plan and lack of motivation some days  []  Improved [x]  No Change    []  Declined   []  Discontinued          Nutrition Monitoring and Evaluation: Purchased less candy. Notes higher stress recently and eating more sweet treats. Working to throw these foods away instead of eating it to limit wasting. Less walking. Interested in using an keyona like Lark and Noom for more consistent accountabilty between sessions. Wants to focus on healthier dinner.  She is off for the week. Previous goals:   No room at home. -do workout dance video Thursday afternoon  Inconsistent. Continued. -resume walking 3 days per week. Minimum time is 45min. - walk on Wednesday. . Try today at 3pm  Not met. Continued. - look up new recipes with daughter for the week. Met. New food brought in with holiday. - box up kid's uneaten food and talk with them about throwing it away if it is not getting eaten. Within 2 days throw away if not claimed. Improved. -holiday plans - less candy, include vegetables to munch on, be active on mellissa day.           Nutrition Prescription and  Intervention Self monitoring with [email or web address] Pal log _ or try noom keyona. Redefining success and monitoring progress with measures other than the scale. Focused on behavior changes instead of weight. Exercise goals -return to walking  Reviewed holiday barriers and successes. Problem solving around [address] and food waste. Patient Education:  [x]  Review current plan with patient   []  Other:    Handouts/  Information Provided: []  Carbohydrates  []  Protein  []  Fiber  []  Serving Sizes  []  Fluids  []  General guidelines []  Diabetes  []  Cholesterol  []  Sodium  []  SBGM  []  Food Journals  []  Others:      Patient Goals Make room to -do workout dance video Thursday afternoon  -resume walking 3 days per week. Minimum time is 45min. - walk on Wednesday. . Try today at 3pm  - look up new recipes with daughter for the week. -start Lark or Noom for daily accountability and coaching  -today's activity: putting mellissa stuff away going up and down stairs. PLAN  [x]  Continue on current plan []  Follow-up PRN   []  Discharge due to :    [x]  Next appt: 2 weeks     Dietitian: Rogers Auguste MS, RD, CSSD    Date: 12/27/2021 Time: 1:30pm     Patti Duckworth is a 49 y.o. female being evaluated by a Virtual Visit (video visit) encounter to address concerns as mentioned above.   A caregiver was present when appropriate. Due to this being a TeleHealth encounter (During AKAOX-23 public health emergency), evaluation of the following areas was limited: Nutrition Focused Physical Exam. Pursuant to the emergency declaration under the [address] [place] [address] and the [employer] and [employer] Act, this Virtual Visit was conducted with patient's (and/or legal guardian's) consent, to reduce the risk of exposure to COVID-19 and provide necessary medical care. Services were provided through a video synchronous discussion virtually to substitute for in-person encounter. --Ernst Downing on 12/27/2021 at 1:30pm    An electronic signature was used to authenticate this note.

## 2022-01-10 ENCOUNTER — APPOINTMENT (OUTPATIENT)
Dept: NUTRITION | Age: 52
End: 2022-01-10
Payer: COMMERCIAL

## 2022-01-19 ENCOUNTER — HOSPITAL ENCOUNTER (OUTPATIENT)
Dept: NUTRITION | Age: 52
Discharge: HOME | End: 2022-01-19
Payer: COMMERCIAL

## 2022-01-19 PROCEDURE — 97803 MED NUTRITION INDIV SUBSEQ: CPT | Performed by: DIETITIAN, REGISTERED

## 2022-01-19 NOTE — PROGRESS NOTES
Carie Avilez was informed of the inherent limitations of a virtual visit,  and has consented to a virtual therapy visit on 2022. Information regarding emergency contact information for this patient during this visit is to contact:  Jose Maldonado at [phone] in addition to calling 911. The patient was informed that at any time during the virtual visit, they can decide to stop the virtual visit. The patient verified that they are physically located in the [facility] for this virtual visit. NUTRITION - FOLLOW-UP TREATMENT NOTE  Patient Name: Carie Avilez         Date: 2022  : 1970    YES Patient  Verified  Diagnosis: Z68.41 (ICD-10-CM) - BMI 40.0-44.9, adult ([facility] [address]   In time:   1:30pm      Out time:  2:00pm   Total Treatment Time (min):   30     SUBJECTIVE/ASSESSMENT  Current Wt: 274 (home) Previous Wt: 279.3 (home) Wt Change: -5.3     Initial Wt: 279.2 (office) Total Wt change: 0(home scale) Height: 68     Changes in medication or medical history? Any new allergies, surgeries or procedures?    no   If yes, update Summary List        Nutrition Diagnosis        Diagnosis Status: New: undesirable food choices R/T culure of overeating and history of [employer] AEB pt report of eating chips and sweets for sole purpose to not waste it. [x]  Improved []  No Change    []  Declined   []  Discontinued       Physical inactivity R/T low motivation and low energy secondary to COVID illness AEB report of no exercise plan and lack of motivation some days  []  Improved [x]  No Change    []  Declined   []  Discontinued          Nutrition Monitoring and Evaluation: Using Noom. Notes steps have been very low (300s). Estimating 1300-1400kcal per day based on Noom. Not tracking added fats from cooking. Note needing to improve on fruits and vegetables. Wants to set an alarm to remember to eat. Pt notes she needs to take control of meals for the week. Previous goals:   Cleaned room. Has not started videos. -Make room to -do workout dance video Thursday afternoon  Not met. -resume walking 3 days per week. Minimum time is 45min. - walk on Wednesday. . Try today at 3pm  Met. - look up new recipes with daughter for the week. -start Lark or Noom for daily accountability and coaching  Met. -today's activity: putting mellissa stuff away going up and down stairs.           Nutrition Prescription and  Intervention Self monitoring with Noom  Redefining success and monitoring progress with measures other than the scale. Focused on behavior changes instead of weight. Exercise goals -return to walking   Problem solving around [address] and food waste. Alarms on phone for reminder to eat to decrease hunger at evening meal and portion size. Patient Education:  [x]  Review current plan with patient   []  Other:    Handouts/  Information Provided: []  Carbohydrates  []  Protein  []  Fiber  []  Serving Sizes  []  Fluids  []  General guidelines []  Diabetes  []  Cholesterol  []  Sodium  []  SBGM  []  Food Journals  []  Others:      Patient Goals -set alarm on phone to remember to eat during the day.   -resume walking 3 days per week. Minimum time is 45min. - walk on Wednesday. . Try today at 2pm  -continue look up new recipes with daughter for the week. PLAN  [x]  Continue on current plan []  Follow-up PRN   []  Discharge due to :    [x]  Next appt: 2 weeks     Dietitian: Clay Dia MS, RD, CSSD    Date: 1/19/2022 Time: 1:30pm     Sosa Pitts is a 49 y.o. female being evaluated by a Virtual Visit (video visit) encounter to address concerns as mentioned above. A caregiver was present when appropriate.  Due to this being a TeleHealth encounter (During [facility]-29 public health emergency), evaluation of the following areas was limited: Nutrition Focused Physical Exam. Pursuant to the emergency declaration under the [facility], 1135 waiver authority and the Coronavirus Preparedness and Response Supplemental Appropriations Act, this Virtual Visit was conducted with patient's (and/or legal guardian's) consent, to reduce the risk of exposure to COVID-19 and provide necessary medical care. Services were provided through a video synchronous discussion virtually to substitute for in-person encounter. --Maxwell Monae on 1/19/2022 at 1:30pm    An electronic signature was used to authenticate this note.

## 2022-02-14 RX ORDER — ESCITALOPRAM OXALATE 10 MG/1
10 TABLET ORAL DAILY
Qty: 30 TABLET | Refills: 0 | Status: SHIPPED | OUTPATIENT
Start: 2022-02-14 | End: 2022-03-15 | Stop reason: SDUPTHER

## 2022-02-14 NOTE — TELEPHONE ENCOUNTER
PCP: Ian Moran NP    Last appt: 2021  Future Appointments   Date Time Provider Akhil Cardoso   2022 11:30 AM Marc Montoya [facility]   2022  8:00 AM Shell Coleman MD [facility]       Requested Prescriptions     Pending Prescriptions Disp Refills    escitalopram oxalate (LEXAPRO) 10 mg tablet 90 Tablet 3     Si Tablet daily.        Prior labs and Blood pressures:  BP Readings from Last 3 Encounters:   21 131/87   21 128/76   20 128/90     Lab Results   Component Value Date/Time    Sodium 139 2021 09:55 AM    Potassium 4.3 2021 09:55 AM    Chloride 106 2021 09:55 AM    CO2 24 2021 09:55 AM    Anion gap 9 2021 09:55 AM    Glucose 93 2021 09:55 AM    BUN 12 2021 09:55 AM    Creatinine 0.77 2021 09:55 AM    BUN/Creatinine ratio 16 2021 09:55 AM    GFR est AA >60 2021 09:55 AM    GFR est non-AA >60 2021 09:55 AM    Calcium 9.3 2021 09:55 AM     Lab Results   Component Value Date/Time    Hemoglobin A1c 5.6 2021 09:55 AM     Lab Results   Component Value Date/Time    Cholesterol, total 170 2021 09:55 AM    HDL Cholesterol 61 2021 09:55 AM    LDL, calculated 78.6 2021 09:55 AM    VLDL, calculated 30.4 2021 09:55 AM    Triglyceride 152 (H) 2021 09:55 AM    CHOL/HDL Ratio 2.8 2021 09:55 AM     Lab Results   Component Value Date/Time    Vitamin D 25-Hydroxy 40.1 2021 09:55 AM       Lab Results   Component Value Date/Time    TSH 1.81 2021 09:55 AM    TSH, 3rd generation 1.39 2020 09:22 AM

## 2022-02-14 NOTE — TELEPHONE ENCOUNTER
Please advise the patient that I will issue her a 30 day refill but she needs to establish care with me before additional refills will be authorized. Thanks!

## 2022-03-15 ENCOUNTER — TELEPHONE (OUTPATIENT)
Dept: INTERNAL MEDICINE CLINIC | Age: 52
End: 2022-03-15

## 2022-03-15 RX ORDER — ESCITALOPRAM OXALATE 10 MG/1
10 TABLET ORAL DAILY
Qty: 30 TABLET | Refills: 1 | Status: SHIPPED | OUTPATIENT
Start: 2022-03-15 | End: 2022-05-19 | Stop reason: SDUPTHER

## 2022-03-15 NOTE — TELEPHONE ENCOUNTER
Patient wants to know if you can supply her 60 day for her Lexapro until her visit. Her visit is on 5/20/22.

## 2022-03-18 PROBLEM — E66.01 OBESITIES, MORBID (HCC): Status: ACTIVE | Noted: 2017-10-05

## 2022-03-18 PROBLEM — E55.9 VITAMIN D DEFICIENCY: Status: ACTIVE | Noted: 2017-10-05

## 2022-03-19 PROBLEM — M72.2 PLANTAR FASCIITIS, BILATERAL: Status: ACTIVE | Noted: 2017-10-05

## 2022-03-19 PROBLEM — F32.A DEPRESSION: Status: ACTIVE | Noted: 2017-10-05

## 2022-03-19 PROBLEM — M79.671 BILATERAL FOOT PAIN: Status: ACTIVE | Noted: 2017-10-05

## 2022-03-19 PROBLEM — M79.672 BILATERAL FOOT PAIN: Status: ACTIVE | Noted: 2017-10-05

## 2022-03-19 PROBLEM — M25.562 LEFT KNEE PAIN: Status: ACTIVE | Noted: 2017-10-05

## 2022-03-19 PROBLEM — J30.9 ALLERGIC RHINITIS: Status: ACTIVE | Noted: 2017-10-05

## 2022-03-20 PROBLEM — I73.00 RAYNAUD'S DISEASE: Status: ACTIVE | Noted: 2017-10-05

## 2022-03-20 PROBLEM — M17.9 DJD (DEGENERATIVE JOINT DISEASE) OF KNEE: Status: ACTIVE | Noted: 2017-10-05

## 2022-04-13 ENCOUNTER — HOSPITAL ENCOUNTER (OUTPATIENT)
Dept: NUTRITION | Age: 52
Discharge: HOME | End: 2022-04-13
Payer: COMMERCIAL

## 2022-04-13 PROCEDURE — 97803 MED NUTRITION INDIV SUBSEQ: CPT | Performed by: DIETITIAN, REGISTERED

## 2022-04-13 NOTE — PROGRESS NOTES
Juan Aponte was informed of the inherent limitations of a virtual visit,  and has consented to a virtual therapy visit on 2022. Information regarding emergency contact information for this patient during this visit is to contact:  Aundra Severe at [phone] in addition to calling 911. The patient was informed that at any time during the virtual visit, they can decide to stop the virtual visit. The patient verified that they are physically located in the [facility] for this virtual visit. NUTRITION - FOLLOW-UP TREATMENT NOTE  Patient Name: Juan Aponte         Date: 2022  : 1970    YES Patient  Verified  Diagnosis: Z68.41 (ICD-10-CM) - BMI 40.0-44.9, adult ([facility] [address]   In time:   11:30am      Out time:  12:10pm   Total Treatment Time (min):   40     SUBJECTIVE/ASSESSMENT  Current Wt: 276.2 (home) Previous Wt: 273.2 (home) Wt Change: +3     Initial Wt: 279.2 (office) Total Wt change: -3 Height: 68     Changes in medication or medical history? Any new allergies, surgeries or procedures?    no   If yes, update Summary List        Nutrition Diagnosis        Diagnosis Status: New: limited adherence to nutrition related recommendations R/T lack of confidence in ability to change AEB pt report of confusion over healthy foods secondary to dieting history, resuming previous eating patterns with unintended weight gain of 3#.     undesirable food choices R/T culure of overeating and history of [employer] AEB pt report of eating chips and sweets for sole purpose to not waste it. []  Improved []  No Change    [x]  Declined   []  Discontinued       Physical inactivity R/T low motivation and low energy secondary to COVID illness AEB report of no exercise plan and lack of motivation some days  []  Improved []  No Change    [x]  Declined   []  Discontinued          Nutrition Monitoring and Evaluation: Back to old eating patterns.  Inconsistent with eating patterns, timing, choices, less vegetable. Thinking about retuning to [address]. Family does not want her to use [employer] again. Looking for easy and convenient options for grab and go. No longer tracking food intake. Not feeling like cooking anything. Pain in leg recently. Recently started using recumbent bike. Still drinking water. 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 Wants to return to vegetable smoothies. Notes when tracking food intake was more aware of choices and calorie intake. Notes felt better when eating more vegetables, exercising, and eating consistent meals daily. Reports hunger when trying to avoid carbohydrate containing foods and some food guilt after eating foods like an apple. Previous goals: Foot pain. switching to bike. -walking 3 days per week. especially after lunch sunshine. Minimum time is 45min. - walk on Wednesday. Not met. Use new recipe page-be  for approving recipes being picked out by kids. Not met. -track on Noom daily. Log dinner. Measuring out portions. Met. -increase water intake. Use larger bottle at home daily   Not met. -continue increased intake of vegetables and fruits with use of smoothie made at home 4+ times per week        Nutrition Prescription and  Intervention Return to Self monitoring with Noom  Reviewed Redefining success and monitoring progress with measures other than the scale. Focused on behavior changes instead of weight. Exercise goals -return to walking or using bike at home  Alarms on phone for reminder to eat to decrease hunger at evening meal and portion size. CBT  Provided convenience and premade versions of meals for use at home. Provided recipe resources.       Patient Education:  [x]  Review current plan with patient   []  Other:    Handouts/  Information Provided: []  Carbohydrates  []  Protein  []  Fiber  []  Serving Sizes  []  Fluids  []  General guidelines []  Diabetes  []  Cholesterol  []  Sodium  []  SBGM  []  Food Journals  [x]  Others: 1800 kcal example 5 day     Patient Goals -bike at home 30min per day (10 min at a time). especially after lunch sunshine. Minimum time is 45min. - walk on Wednesday. Continue trying to walk as able. -be  for approving recipes being picked out by kids.   -track on Noom daily. Log dinner. Measuring out portions. -increase water intake. Use larger bottle at home daily   -return to increased intake of vegetables and fruits with use of smoothie made at home 4+ times per week     PLAN  [x]  Continue on current plan []  Follow-up PRN   []  Discharge due to :    [x]  Next appt: 2 weeks     Dietitian: Annabel Guy MS, RD, CSSD    Date: 4/13/2022 Time: 11:30am     Jose Gee is a 49 y.o. female being evaluated by a Virtual Visit (video visit) encounter to address concerns as mentioned above. A caregiver was present when appropriate. Due to this being a TeleHealth encounter (During [facility]-57 public health emergency), evaluation of the following areas was limited: Nutrition Focused Physical Exam. Pursuant to the emergency declaration under the [address] [place] [address], [address] [place] [address] authority and the [employer] General Act, this Virtual Visit was conducted with patient's (and/or legal guardian's) consent, to reduce the risk of exposure to COVID-19 and provide necessary medical care. Services were provided through a video synchronous discussion virtually to substitute for in-person encounter. --James Snow on 4/13/2022 at 11:30am    An electronic signature was used to authenticate this note.

## 2022-05-05 ENCOUNTER — HOSPITAL ENCOUNTER (OUTPATIENT)
Dept: NUTRITION | Age: 52
Discharge: HOME | End: 2022-05-05
Payer: COMMERCIAL

## 2022-05-05 PROCEDURE — 97803 MED NUTRITION INDIV SUBSEQ: CPT | Performed by: DIETITIAN, REGISTERED

## 2022-05-05 NOTE — PROGRESS NOTES
Krystal Skinner was informed of the inherent limitations of a virtual visit,  and has consented to a virtual therapy visit on 2022. Information regarding emergency contact information for this patient during this visit is to contact:  Lopez Hilario at [phone] in addition to calling 911. The patient was informed that at any time during the virtual visit, they can decide to stop the virtual visit. The patient verified that they are physically located in the state of [address] [place] [address] for this virtual visit. NUTRITION - FOLLOW-UP TREATMENT NOTE  Patient Name: Krystal Skinner         Date: 2022  : 1970    YES Patient  Verified  Diagnosis: Z68.41 (ICD-10-CM) - BMI 40.0-44.9, adult ([facility] [address]   In time:   11:30am      Out time:  12:10pm   Total Treatment Time (min):   40     SUBJECTIVE/ASSESSMENT  Current Wt: 276.6  (home) Previous Wt: 276.2 (home) Wt Change: +0.4     Initial Wt: 279.2 (office) Total Wt change: -2.6 Height: 68     Changes in medication or medical history? Any new allergies, surgeries or procedures? yes   If yes, update Summary List   Added Balance of Nature Fruit and Veggie capsules 1 serving each per day. Nutrition Diagnosis        Diagnosis Status: limited adherence to nutrition related recommendations R/T lack of confidence in ability to change AEB pt report of confusion over healthy foods secondary to dieting history, resuming previous eating patterns with unintended weight gain of 3#.   [x]  Improved []  No Change    []  Declined   []  Discontinued       undesirable food choices R/T culure of overeating and history of [employer] AEB pt report of eating chips and sweets for sole purpose to not waste it.    [x]  Improved []  No Change    []  Declined   []  Discontinued       Physical inactivity R/T low motivation and low energy secondary to COVID illness AEB report of no exercise plan and lack of motivation some days  [x]  Improved []  No Change    []  Declined   [] Discontinued          Nutrition Monitoring and Evaluation: Things are going better since following a recipes and making her own food. Making a big batch of food to eat over the week. In the office 2-3 days per week. Notes does better when out of the house. Little changes to recipes for higher nutrient dense options. Whole grain bread instead of white. Has grab ready options for if not time. Notes feeling church when making fruits and vegetables. Tracking calories 1800kcal on Noom  Overall feeling better. Upcoming camping trip. Wants to sit down with  to plan out menu. Normally drinking alcohol, and minimal fruits and vegeables packed. Usually drinks less water. [employer] trip coming up in 1.5 months. Will start thinking about typical patterns and how she wants this trip to be different for food choices. Previous goals:   Met. Riding 10min daily minimum. Up to 3 times some days -bike at home 30min per day (10 min at a time). especially after lunch sunshine. Minimum time is 45min. - walk on Wednesday. Continue trying to walk as able. Met. Making her own food. -be  for approving recipes being picked out by kids. Met. -track on Noom daily. Log dinner. Measuring out portions. -increase water intake. Use larger bottle at home daily   Met .-return to increased intake of vegetables and fruits with use of smoothie made at home 4+ times per week        Nutrition Prescription and  Intervention continue Self monitoring with Noom  Reviewed Redefining success and monitoring progress with measures other than the scale. Focused on behavior changes instead of weight. Exercise goals -continue to walking or using bike at home  Alarms on phone for reminder to eat to decrease hunger at evening meal and portion size.    CBT        Patient Education:  [x]  Review current plan with patient   []  Other:    Handouts/  Information Provided: []  Carbohydrates  []  Protein  []  Fiber  [] Serving Sizes  []  Fluids  []  General guidelines []  Diabetes  []  Cholesterol  []  Sodium  []  SBGM  []  Food Journals  [x]  Others:      Patient Goals -bike at home 30min per day (10 min at a time). especially after lunch sunshine. Minimum time is 45min. - walk on Wednesday. Continue trying to walk as able. -be  for approving recipes being picked out by kids.   -track on Noom daily. Log dinner. Measuring out portions.    -return to increased intake of vegetables and fruits with use of smoothie made at home 4+ times per week or bonus of the capsules.   -camping: plan meals with more fruits and vegetables, decrease alcohol intake towards 1-2 per day, swap out for water. Walk with friend (more times than typical)      PLAN  [x]  Continue on current plan []  Follow-up PRN   []  Discharge due to :    [x]  Next appt: 2 weeks     Dietitian: Vini Vu MS, RD, CSSD    Date: 5/5/2022 Time: 11:30am     Urban Boxer is a 49 y.o. female being evaluated by a Virtual Visit (video visit) encounter to address concerns as mentioned above. A caregiver was present when appropriate. Due to this being a TeleHealth encounter (During [facility]-26 public health emergency), evaluation of the following areas was limited: Nutrition Focused Physical Exam. Pursuant to the emergency declaration under the [address] [place] [address], [address] [place] [address] and the [employer] General Act, this Virtual Visit was conducted with patient's (and/or legal guardian's) consent, to reduce the risk of exposure to COVID-19 and provide necessary medical care. Services were provided through a video synchronous discussion virtually to substitute for in-person encounter. --Gaby Valenzuela on 5/5/2022 at 11:30am    An electronic signature was used to authenticate this note.

## 2022-05-18 PROBLEM — E66.9 OBESITY: Status: ACTIVE | Noted: 2017-10-05

## 2022-05-18 PROBLEM — F41.8 ANXIETY WITH DEPRESSION: Status: ACTIVE | Noted: 2017-10-05

## 2022-05-18 NOTE — PROGRESS NOTES
Subjective:     Chief Complaint   Patient presents with   Rosalio 89 is a 49 y.o. F.  She has not been seen in this clinic since May 2021. She is noted to have a history of severe morbid obesity, anxiety, depression, asthma, and a reported history of Raynaud's disease. At her last appointment about a year ago, she had complained of right-sided lower back pain with radiation down the left leg. Physical examination at the time was generally notable only for obesity with a negative straight leg raise bilaterally. There was mild discomfort with hyperextension and left lateral bending. Range of motion at the hips was normal.  She was referred for colonoscopy and advised to lose weight. Since that time, she has mainly been seen by nutrition for assistance with weight loss. Today, the patient is seen for a virtual visit because her  recently was diagnosed with COVID. She reports feeling generally fine today and has not yet tested positive. She notes that she has been having some increased anxiety and depression of late, but has been working with her therapist using CBT. She typically has worsening depression and anxiety around her menstrual periods, and for this is taking Geeta. She feels that this regimen is overall helpful. She denies any suicidal homicidal ideation. She has a history of mild intermittent asthma, for which she uses Symbicort on an as-needed basis. She has not had to resort to a rescue inhaler at all over the past month or so. She has not) previously hospitalized for asthma. Symbicort continues to be helpful when she does resort to it, which recently has been quite rarely. She has been trying to lose weight, but has found it difficult to leave the house sometimes, secondary to her anxiety and depression. During the pandemic, she has been finding her self staying at home more often.   However, she is aware of the need to try to engage with others, and has been talking with her therapist about this. Review of systems is otherwise negative. Routine Healthcare Maintenance issues are reviewed and discussed with the patient as noted below. Orders to update gaps in healthcare maintenance were placed as noted below in the Assessment and Plan, where applicable. Physical examination over video demonstrates a pleasant, well-developed, well-nourished, obese white female in no acute distress. Respiratory pattern is normal.     10-year Cardiovascular Risk Sharif Viveros, 2013): The 10-year ASCVD risk score (Mery Egan, et al., 2013) is: 0.9%    Values used to calculate the score:      Age: 49 years      Sex: Female      Is Non- : No      Diabetic: No      Tobacco smoker: No      Systolic Blood Pressure: 037 mmHg      Is BP treated: No      HDL Cholesterol: 61 MG/DL      Total Cholesterol: 170 MG/DL       Past Medical History:  Past Medical History:   Diagnosis Date    Allergic rhinitis 10/5/2017    Anxiety with depression 10/05/2017    Asthma     mild persistent, RX = Symbicort    Bilateral foot pain 10/5/2017    DJD (degenerative joint disease) of knee 10/5/2017    Obesity 10/05/2017    Plantar fasciitis, bilateral 10/5/2017    Raynaud's disease 10/5/2017    Vitamin D deficiency 10/5/2017       Past Surgical Histor:  Past Surgical History:   Procedure Laterality Date    HX WISDOM TEETH EXTRACTION      HX WRIST FRACTURE [place]  2005       Allergies:  No Known Allergies    Medications:  Current Outpatient Medications   Medication Sig Dispense Refill    levocetirizine (Xyzal) 5 mg tablet Xyzal 5 mg tablet   Take 1 tablet every day by oral route.  albuterol (PROVENTIL HFA, VENTOLIN HFA, PROAIR HFA) 90 mcg/actuation inhaler Take 1 Puff by inhalation every six (6) hours as needed for Wheezing, Shortness of Breath or Cough for up to 30 days. 18 g 2    escitalopram oxalate (LEXAPRO) 10 mg tablet Take 1 Tablet by mouth daily for 60 days. Indications: anxiousness associated with depression, major depressive disorder 30 Tablet 1    buPROPion SR (WELLBUTRIN SR) 150 mg SR tablet TAKE 1 TABLET TWICE A  Tablet 3    budesonide-formoteroL (SYMBICORT) 160-4.5 mcg/actuation HFAA Take 2 Puffs by inhalation two (2) times a day. 3 Inhaler 3    drospirenone-ethinyl estradioL (April, 28,) 3-0.02 mg tab APRIL (28) 3 mg-0.02 mg tablet   Take 1 tablet every day by oral route.  fluticasone (FLONASE) 50 mcg/actuation nasal spray 2 Sprays by Both Nostrils route daily.       predniSONE (STERAPRED DS) 10 mg dose pack Take as directed on packet 21 Tablet 0       Social History:  Social History     Socioeconomic History    Marital status:    Occupational History    Occupation: [employer]   Tobacco Use    Smoking status: Never Smoker    Smokeless tobacco: Never Used   Vaping Use    Vaping Use: Never used   Substance and Sexual Activity    Alcohol use: Yes     Comment: occasional    Drug use: No    Sexual activity: Yes     Partners: Female     Social Determinants of Health     Physical Activity: Insufficiently Active    Days of Exercise per Week: 1 day    Minutes of Exercise per Session: 30 min       Family History:  Family History   Problem Relation Age of Onset    No Known Problems Mother     Colon Polyps Father     Cancer Father         esophageal    Cancer Sister        Immunizations:  Immunization History   Administered Date(s) Administered    MESERETID-19, Adam Cai, Primary or Immunocompromised Series, MRNA, PF, 100mcg/0.5mL 03/07/2021, 04/11/2021    Tdap 02/19/2020        Healthcare Maintenance:  Health Maintenance   Topic Date Due    Pneumococcal 0-64 years (1 - PCV) Never done    Depression Monitoring  Never done    Shingrix Vaccine Age 50> (1 of 2) Never done    COVID-19 Vaccine (3 - Booster for Moderna series) 09/11/2021    Flu Vaccine (Season Ended) 09/01/2022    Breast Cancer Screen Mammogram  01/26/2023    Cervical cancer screen 11/09/2024    Lipid Screen  05/18/2026    Colorectal Cancer Screening Combo  01/20/2027    DTaP/Tdap/Td series (2 - Td or Tdap) 02/19/2030    Hepatitis C Screening  Completed        Review of Systems:  ROS:  Review of Systems   Constitutional: Negative. HENT: Negative. Eyes: Negative. Respiratory: Negative. Negative for cough, hemoptysis, sputum production, shortness of breath and wheezing. Cardiovascular: Negative. Gastrointestinal: Negative. Genitourinary: Negative. Musculoskeletal: Negative. Skin: Negative. Neurological: Negative. Endo/Heme/Allergies: Negative. Psychiatric/Behavioral: Positive for depression. Negative for hallucinations, memory loss, substance abuse and suicidal ideas. The patient is nervous/anxious. The patient does not have insomnia. ROS otherwise negative      Objective:     Vital Signs: There were no vitals taken for this visit. BMI:  There is no height or weight on file to calculate BMI. Physical Examination:  Physical Exam  Constitutional:       Appearance: Normal appearance. She is obese. HENT:      Head: Atraumatic. Pulmonary:      Effort: Pulmonary effort is normal.   Neurological:      Mental Status: She is alert and oriented to person, place, and time. Mental status is at baseline. Psychiatric:         Mood and Affect: Mood normal.         Behavior: Behavior normal.         Thought Content: Thought content normal.         Judgment: Judgment normal.          Physical exam otherwise negative    Diagnostic Testing:    Laboratory Studies:  No visits with results within 1 Year(s) from this visit.    Latest known visit with results is:   Office Visit on 05/18/2021   Component Date Value Ref Range Status    T4, Free 05/18/2021 0.9  0.8 - 1.5 NG/DL Final    TSH 05/18/2021 1.81  0.36 - 3.74 uIU/mL Final    Comment:   Due to TSH heterogeneity, both structurally and degree of glycosylation,  monoclonal antibodies used in the TSH assay may not accurately quantitate TSH. Therefore, this result should be correlated with clinical findings as well as  with other assessments of thyroid function, e.g., free T4, free T3.  Vitamin D 25-Hydroxy 05/18/2021 40.1  30 - 100 ng/mL Final    Comment: (NOTE)  Deficiency               <20 ng/mL  Insufficiency          20-30 ng/mL  Sufficient             ng/mL  Possible toxicity       >100 ng/mL    The Method used is Siemens Advia Centaur currently standardized to a   Center of Disease Control and Prevention (CDC) certified reference   [facility]. Samples containing fluorescein dye can produce falsely   elevated values when tested with the ADVIA Centaur Vitamin D Assay. It is recommended that results in the toxic range, >100 ng/mL, be   retested 72 hours post fluorescein exposure.  Sodium 05/18/2021 139  136 - 145 mmol/L Final    Potassium 05/18/2021 4.3  3.5 - 5.1 mmol/L Final    Chloride 05/18/2021 106  97 - 108 mmol/L Final    CO2 05/18/2021 24  21 - 32 mmol/L Final    Anion gap 05/18/2021 9  5 - 15 mmol/L Final    Glucose 05/18/2021 93  65 - 100 mg/dL Final    BUN 05/18/2021 12  6 - 20 MG/DL Final    Creatinine 05/18/2021 0.77  0.55 - 1.02 MG/DL Final    BUN/Creatinine ratio 05/18/2021 16  12 - 20   Final    GFR est AA 05/18/2021 >60  >60 ml/min/1.73m2 Final    GFR est non-AA 05/18/2021 >60  >60 ml/min/1.73m2 Final    Comment: Estimated GFR is calculated using the IDMS-traceable Modification of Diet in  Renal Disease (MDRD) Study equation, reported for both  Americans  (GFRAA) and non- Americans (GFRNA), and normalized to 1.73m2 body  surface area. The physician must decide which value applies to the patient.  Calcium 05/18/2021 9.3  8.5 - 10.1 MG/DL Final    Bilirubin, total 05/18/2021 0.3  0.2 - 1.0 MG/DL Final    ALT (SGPT) 05/18/2021 19  12 - 78 U/L Final    AST (SGOT) 05/18/2021 11* 15 - 37 U/L Final    Alk.  phosphatase 05/18/2021 83  45 - 117 U/L Final    Protein, total 05/18/2021 7.5  6.4 - 8.2 g/dL Final    Albumin 05/18/2021 3.7  3.5 - 5.0 g/dL Final    Globulin 05/18/2021 3.8  2.0 - 4.0 g/dL Final    A-G Ratio 05/18/2021 1.0* 1.1 - 2.2   Final    Cholesterol, total 05/18/2021 170  <200 MG/DL Final    Triglyceride 05/18/2021 152* <150 MG/DL Final    Comment: Based on NCEP-ATP III:  Triglycerides <150 mg/dL  is considered normal, 150-199  mg/dL  borderline high,  200-499 mg/dL high and  greater than or equal to 500  mg/dL very high.  HDL Cholesterol 05/18/2021 61  MG/DL Final    Comment: Based on NCEP ATP III, HDL Cholesterol <40 mg/dL is considered low and >60  mg/dL is elevated.       LDL, calculated 05/18/2021 78.6  0 - 100 MG/DL Final    Comment: Based on the NCEP-ATP: LDL-C concentrations are considered  optimal <100 mg/dL,  near optimal/above Normal 100-129 mg/dL Borderline High: 130-159, High: 160-189  mg/dL Very High: Greater than or equal to 190 mg/dL      VLDL, calculated 05/18/2021 30.4  MG/DL Final    CHOL/HDL Ratio 05/18/2021 2.8  0.0 - 5.0   Final    Hep C virus Ab Interp. 05/18/2021 NONREACTIVE  NONREACTIVE   Final    Hep C  virus Ab comment 05/18/2021 Method used is Siemens Advia Centaur    Final    Hemoglobin A1c 05/18/2021 5.6  4.0 - 5.6 % Final    Comment: NEW METHOD PLEASE NOTE NEW REFERENCE RANGE  (NOTE)  HbA1C Interpretive Ranges  <5.7              Normal  5.7 - 6.4         Consider Prediabetes  >6.5              Consider Diabetes      Est. average glucose 05/18/2021 114  mg/dL Final    WBC 05/18/2021 7.9  3.6 - 11.0 K/uL Final    RBC 05/18/2021 4.46  3.80 - 5.20 M/uL Final    HGB 05/18/2021 13.1  11.5 - 16.0 g/dL Final    HCT 05/18/2021 40.0  35.0 - 47.0 % Final    MCV 05/18/2021 89.7  80.0 - 99.0 FL Final    MCH 05/18/2021 29.4  26.0 - 34.0 PG Final    MCHC 05/18/2021 32.8  30.0 - 36.5 g/dL Final    RDW 05/18/2021 14.1  11.5 - 14.5 % Final    PLATELET 07/36/2848 006  150 - 400 K/uL Final    MPV 05/18/2021 11.7  8.9 - 12.9 FL Final    NRBC 05/18/2021 0.0  0  WBC Final    ABSOLUTE NRBC 05/18/2021 0.00  0.00 - 0.01 K/uL Final    NEUTROPHILS 05/18/2021 58  32 - 75 % Final    LYMPHOCYTES 05/18/2021 36  12 - 49 % Final    MONOCYTES 05/18/2021 4* 5 - 13 % Final    EOSINOPHILS 05/18/2021 1  0 - 7 % Final    BASOPHILS 05/18/2021 1  0 - 1 % Final    IMMATURE GRANULOCYTES 05/18/2021 0  0.0 - 0.5 % Final    ABS. NEUTROPHILS 05/18/2021 4.6  1.8 - 8.0 K/UL Final    ABS. LYMPHOCYTES 05/18/2021 2.9  0.8 - 3.5 K/UL Final    ABS. MONOCYTES 05/18/2021 0.4  0.0 - 1.0 K/UL Final    ABS. EOSINOPHILS 05/18/2021 0.1  0.0 - 0.4 K/UL Final    ABS. BASOPHILS 05/18/2021 0.1  0.0 - 0.1 K/UL Final    ABS. IMM. GRANS. 05/18/2021 0.0  0.00 - 0.04 K/UL Final    DF 05/18/2021 AUTOMATED    Final    Color 05/18/2021 DARK YELLOW    Final    Color Reference Range: Straw, Yellow or Dark Yellow    Appearance 05/18/2021 TURBID* CLEAR   Final    Specific gravity 05/18/2021 1.022  1.003 - 1.030   Final    pH (UA) 05/18/2021 7.0  5.0 - 8.0   Final    Protein 05/18/2021 30* Negative mg/dL Final    Glucose 05/18/2021 Negative  Negative mg/dL Final    Ketone 05/18/2021 TRACE* Negative mg/dL Final    Bilirubin 05/18/2021 Negative  Negative   Final    Blood 05/18/2021 TRACE* Negative   Final    Urobilinogen 05/18/2021 0.2  0.2 - 1.0 EU/dL Final    Nitrites 05/18/2021 Negative  Negative   Final    Leukocyte Esterase 05/18/2021 LARGE* Negative   Final    WBC 05/18/2021   0 - 4 /hpf Final    CLUMPING IS PRESENT.  RBC 05/18/2021 0-5  0 - 5 /hpf Final    Epithelial cells 05/18/2021 MANY* FEW /lpf Final    Comment: Epithelial cell category consists of squamous cells and /or transitional  urothelial cells. Renal tubular cells, if present, are separately identified as  such.       Bacteria 05/18/2021 2+* Negative /hpf Final    UA:UC IF INDICATED 05/18/2021 URINE CULTURE ORDERED* CULTURE NOT INDICATED BY UA RESULT   Final    Hyaline cast 05/18/2021 0-2  0 - 5 /lpf Final    Urine culture hold 05/18/2021 Urine on hold in Microbiology dept for 2 days. If unpreserved urine is submitted, it cannot be used for addtional testing after 24 hours, recollection will be required. Final    Special Requests: 05/18/2021     Final                    Value:NO SPECIAL REQUESTS  Reflexed from [id number]      Culture result: 05/18/2021 No significant growth, <10,000 CFU/mL    Final         Radiographic Studies:  XR Results (most recent):  Results from Hospital Encounter encounter on 07/08/21    XR KNEE RT MAX 2 VWS    Narrative  EXAM: XR KNEE RT MAX 2 VWS    INDICATION: Right. Knee pain    COMPARISON: None. FINDINGS: Two views of the right knee demonstrate no fracture or other acute  osseous or articular abnormality. There is no effusion. Mild medial joint space  narrowing. Mild DJD patella. Impression  No acute abnormality. [facility] Results (most recent):  Results from Abstract encounter on 06/21/21    [facility] MAMMO BI SCREENING INCL CAD     CT Results (most recent):  No results found for this or any previous visit. DEXA Results (most recent):  No results found for this or any previous visit. MRI Results (most recent):  No results found for this or any previous visit. Assessment/Plan:       ICD-10-CM ICD-9-CM    1. Routine adult health maintenance  Z00.00 V70.0 HEMOGLOBIN A1C WITH EAG      CBC WITH AUTOMATED DIFF      METABOLIC PANEL, COMPREHENSIVE      LIPID PANEL      VITAMIN D, 25 HYDROXY   2. Class 3 severe obesity due to excess calories with serious comorbidity and body mass index (BMI) of 40.0 to 44.9 in adult ([facility]  E66.01 278.01     Z68.41 V85.41    3. Mild persistent asthma without complication  U09.22 726.62 albuterol (PROVENTIL HFA, VENTOLIN HFA, PROAIR HFA) 90 mcg/actuation inhaler   4. Anxiety with depression  F41.8 300.4    5.  Vitamin D deficiency  E55.9 268.9           Healthcare Maintenance:  - Preventive measures are reviewed as per above  - Up to date on routine interventions except as noted above  - Orders placed to update gaps as noted  - Notes: Fasting labs ordered. Patient advised to obtain these when no longer requiring quarantine. She is notably due for pneumococcal vaccination, which will be accomplished at her next in person visit. Asthma/COPD:   - Current Symptoms: taking medications as instructed, no medication side effects noted, no significant ongoing wheezing or shortness of breath, using bronchodilator MDI less than twice a week, allergy symptoms well controlled   - CAT/ACT: n/a   - MMRC: n/a   - Current Assessment: asymptomatic, well-controlled. - Current Regimen:   Key COPD Medications             albuterol (PROVENTIL HFA, VENTOLIN HFA, PROAIR HFA) 90 mcg/actuation inhaler (Taking) Take 1 Puff by inhalation every six (6) hours as needed for Wheezing, Shortness of Breath or Cough for up to 30 days. budesonide-formoteroL (SYMBICORT) 160-4.5 mcg/actuation HFAA (Taking) Take 2 Puffs by inhalation two (2) times a day. predniSONE (STERAPRED DS) 10 mg dose pack Take as directed on packet         - Plan: current treatment plan is effective, no change in therapy, orders as documented in [place], lab results and schedule of future lab studies reviewed with patient, reviewed use of rescue vs controlling agents, oral and inhaled meds and potential side effects, critical need for compliance with treatment plan to achieve optimal results, reviewed medications and side effects in detail, the following changes are made - added Proair for PRN use. Obesity:   -Advised on continued efforts at weight loss. Depression/Anxiety:   - Often getting depressed/sad with menses. On Geeta for this as well. Doing CBT with therapist and taking Lexapro. Augmented with Wellbutrin. No SI/HI/AH/VH. Continuing with current care.       Christina Mosley MD    Please note that this dictation was completed with [employer], the [employer] voice recognition software. Quite often unanticipated grammatical, syntax, homophones, and other interpretive errors are inadvertently transcribed by the computer software. Please disregard these errors. Please excuse any errors that have escaped final proofreading.

## 2022-05-19 ENCOUNTER — HOSPITAL ENCOUNTER (OUTPATIENT)
Dept: NUTRITION | Age: 52
Discharge: HOME | End: 2022-05-19
Payer: COMMERCIAL

## 2022-05-19 PROCEDURE — 97803 MED NUTRITION INDIV SUBSEQ: CPT | Performed by: DIETITIAN, REGISTERED

## 2022-05-19 RX ORDER — ESCITALOPRAM OXALATE 10 MG/1
10 TABLET ORAL DAILY
Qty: 30 TABLET | Refills: 1 | Status: SHIPPED | OUTPATIENT
Start: 2022-05-19 | End: 2022-05-20 | Stop reason: SDUPTHER

## 2022-05-19 NOTE — TELEPHONE ENCOUNTER
PCP: Calin Ang NP    Last appt: 5/18/2021  Future Appointments   Date Time Provider Akhil Cardoso   5/19/2022 12:00 PM [facility]   5/20/2022  8:00 AM Sarina Horne MD [facility]       Requested Prescriptions     Pending Prescriptions Disp Refills    escitalopram oxalate (LEXAPRO) 10 mg tablet 30 Tablet 1     Sig: Take 1 Tablet by mouth daily for 60 days.  Indications: anxiousness associated with depression, major depressive disorder       Prior labs and Blood pressures:  BP Readings from Last 3 Encounters:   07/08/21 131/87   05/18/21 128/76   04/13/20 128/90     Lab Results   Component Value Date/Time    Sodium 139 05/18/2021 09:55 AM    Potassium 4.3 05/18/2021 09:55 AM    Chloride 106 05/18/2021 09:55 AM    CO2 24 05/18/2021 09:55 AM    Anion gap 9 05/18/2021 09:55 AM    Glucose 93 05/18/2021 09:55 AM    BUN 12 05/18/2021 09:55 AM    Creatinine 0.77 05/18/2021 09:55 AM    BUN/Creatinine ratio 16 05/18/2021 09:55 AM    GFR est AA >60 05/18/2021 09:55 AM    GFR est non-AA >60 05/18/2021 09:55 AM    Calcium 9.3 05/18/2021 09:55 AM     Lab Results   Component Value Date/Time    Hemoglobin A1c 5.6 05/18/2021 09:55 AM     Lab Results   Component Value Date/Time    Cholesterol, total 170 05/18/2021 09:55 AM    HDL Cholesterol 61 05/18/2021 09:55 AM    LDL, calculated 78.6 05/18/2021 09:55 AM    VLDL, calculated 30.4 05/18/2021 09:55 AM    Triglyceride 152 (H) 05/18/2021 09:55 AM    CHOL/HDL Ratio 2.8 05/18/2021 09:55 AM     Lab Results   Component Value Date/Time    Vitamin D 25-Hydroxy 40.1 05/18/2021 09:55 AM       Lab Results   Component Value Date/Time    TSH 1.81 05/18/2021 09:55 AM    TSH, 3rd generation 1.39 02/19/2020 09:22 AM

## 2022-05-19 NOTE — PROGRESS NOTES
Aaron Rodriges was informed of the inherent limitations of a virtual visit,  and has consented to a virtual therapy visit on 2022. Information regarding emergency contact information for this patient during this visit is to contact:  Kika Conteh at [phone] in addition to calling 911. The patient was informed that at any time during the virtual visit, they can decide to stop the virtual visit. The patient verified that they are physically located in the [facility] for this virtual visit. NUTRITION - FOLLOW-UP TREATMENT NOTE  Patient Name: Aaron Rodriges         Date: 2022  : 1970    YES Patient  Verified  Diagnosis: Z68.41 (ICD-10-CM) - BMI 40.0-44.9, adult ([facility] [address]   In time:   12:00pm      Out time:  12:30pm   Total Treatment Time (min):  30     SUBJECTIVE/ASSESSMENT  Current Wt: 278  (home) Previous Wt: 276.6  (home) Wt Change: +1.4     Initial Wt: 279.2 (office) Total Wt change: -1.2 Height: 68     Changes in medication or medical history? Any new allergies, surgeries or procedures? No If yes, update Summary List        Nutrition Diagnosis        Diagnosis Status: limited adherence to nutrition related recommendations R/T lack of confidence in ability to change AEB pt report of confusion over healthy foods secondary to dieting history, resuming previous eating patterns with unintended weight gain of 3#.   [x]  Improved []  No Change    []  Declined   []  Discontinued       undesirable food choices R/T culure of overeating and history of [employer] Plate [employer] AEB pt report of eating chips and sweets for sole purpose to not waste it.    [x]  Improved []  No Change    []  Declined   []  Discontinued       Physical inactivity R/T low motivation and low energy secondary to COVID illness AEB report of no exercise plan and lack of motivation some days  [x]  Improved []  No Change    []  Declined   []  Discontinued          Nutrition Monitoring and Evaluation:  has tested positive for COVID. No longer planning on going camping this weekend . tracking somewhat in MyFitness Pal. Not tracking dinners. Portions at dinner smaller. More activity. On bike at least 1 time per day. Walking with friend more often. Pt notes looking for a quick fix and motivation for behavior change continues to fluctuate. Previous goals:   Met. -bike at home 30min per day (10 min at a time). especially after lunch sunshine. Minimum time is 45min. - walk on Wednesday. Continue trying to walk as able. Met. -be  for approving recipes being picked out by kids. Met. -track on Noom daily. Log dinner. Measuring out portions. Met. -return to increased intake of vegetables and fruits with use of smoothie made at home 4+ times per week or bonus of the capsules. Not camping due to [place]. -camping: plan meals with more fruits and vegetables, decrease alcohol intake towards 1-2 per day, swap out for water. Walk with friend (more times than typical)      Nutrition Prescription and  Intervention continue Self monitoring with Noom/[employer] Pal  Reviewed Redefining success and monitoring progress with measures other than the scale. Focused on behavior changes instead of weight. Exercise goals -continue to walking or using bike at home  Educated on smoothie recipes and how to reduce calories. Preparing snacks ahead of time to improve consistency of choosing vegetables for snacks. CBT        Patient Education:  [x]  Review current plan with patient   []  Other:    Handouts/  Information Provided: []  Carbohydrates  []  Protein  []  Fiber  []  Serving Sizes  []  Fluids  []  General guidelines []  Diabetes  []  Cholesterol  []  Sodium  []  SBGM  []  Food Journals  [x]  Others:      Patient Goals -bike at home 30min per day (10 min at a time). Wear supportive shoes and walk 3 times per week  -prep lunches and snacks on sundays  -track on Noom/MyFitnessPal daily. Log dinner. Measuring out portions. -box up veggies for snacks- 1 veggie snack per day  -use water instead of coconut water in smoothie recipes instead of pineapple juice     PLAN  [x]  Continue on current plan []  Follow-up PRN   []  Discharge due to :    [x]  Next appt: 2 weeks     Dietitian: Sacha Godoy MS, RD, CSSD    Date: 5/19/2022 Time: 12:30pm     Frank Coleman is a 49 y.o. female being evaluated by a Virtual Visit (video visit) encounter to address concerns as mentioned above. A caregiver was present when appropriate. Due to this being a TeleHealth encounter (During [facility]- public health emergency), evaluation of the following areas was limited: Nutrition Focused Physical Exam. Pursuant to the emergency declaration under the [address] [place] [address], [address] [place] [address] authority and the [employer] General Act, this Virtual Visit was conducted with patient's (and/or legal guardian's) consent, to reduce the risk of exposure to COVID-19 and provide necessary medical care. Services were provided through a video synchronous discussion virtually to substitute for in-person encounter. --Edel Araujo on 5/19/2022 at 12:30pm    An electronic signature was used to authenticate this note.

## 2022-05-20 ENCOUNTER — VIRTUAL VISIT (OUTPATIENT)
Dept: INTERNAL MEDICINE CLINIC | Age: 52
End: 2022-05-20
Payer: COMMERCIAL

## 2022-05-20 DIAGNOSIS — F41.8 ANXIETY WITH DEPRESSION: ICD-10-CM

## 2022-05-20 DIAGNOSIS — J45.30 MILD PERSISTENT ASTHMA WITHOUT COMPLICATION: ICD-10-CM

## 2022-05-20 DIAGNOSIS — E66.01 CLASS 3 SEVERE OBESITY DUE TO EXCESS CALORIES WITH SERIOUS COMORBIDITY AND BODY MASS INDEX (BMI) OF 40.0 TO 44.9 IN ADULT (HCC): ICD-10-CM

## 2022-05-20 DIAGNOSIS — E55.9 VITAMIN D DEFICIENCY: ICD-10-CM

## 2022-05-20 DIAGNOSIS — Z00.00 ROUTINE ADULT HEALTH MAINTENANCE: Primary | ICD-10-CM

## 2022-05-20 PROCEDURE — 99214 OFFICE O/P EST MOD 30 MIN: CPT | Performed by: INTERNAL MEDICINE

## 2022-05-20 RX ORDER — ESCITALOPRAM OXALATE 10 MG/1
10 TABLET ORAL DAILY
Qty: 30 TABLET | Refills: 1 | Status: SHIPPED | OUTPATIENT
Start: 2022-05-20 | End: 2022-07-19

## 2022-05-20 RX ORDER — BUDESONIDE AND FORMOTEROL FUMARATE DIHYDRATE 160; 4.5 UG/1; UG/1
2 AEROSOL RESPIRATORY (INHALATION) 2 TIMES DAILY
Qty: 4 EACH | Refills: 5 | Status: SHIPPED | OUTPATIENT
Start: 2022-05-20 | End: 2023-05-15

## 2022-05-20 RX ORDER — BUPROPION HYDROCHLORIDE 150 MG/1
150 TABLET, EXTENDED RELEASE ORAL 2 TIMES DAILY
Qty: 180 TABLET | Refills: 3 | Status: SHIPPED | OUTPATIENT
Start: 2022-05-20 | End: 2023-05-15

## 2022-05-20 RX ORDER — LEVOCETIRIZINE DIHYDROCHLORIDE 5 MG/1
TABLET, FILM COATED ORAL
COMMUNITY
Start: 2020-03-04

## 2022-05-20 RX ORDER — ALBUTEROL SULFATE 90 UG/1
1 AEROSOL, METERED RESPIRATORY (INHALATION)
Qty: 18 G | Refills: 2 | Status: SHIPPED | OUTPATIENT
Start: 2022-05-20 | End: 2022-06-19

## 2022-07-07 ENCOUNTER — APPOINTMENT (OUTPATIENT)
Dept: NUTRITION | Age: 52
End: 2022-07-07
Payer: COMMERCIAL

## 2022-07-08 ENCOUNTER — HOSPITAL ENCOUNTER (OUTPATIENT)
Dept: NUTRITION | Age: 52
Discharge: HOME | End: 2022-07-08
Payer: COMMERCIAL

## 2022-07-08 PROCEDURE — 97803 MED NUTRITION INDIV SUBSEQ: CPT | Performed by: DIETITIAN, REGISTERED

## 2022-07-08 NOTE — PROGRESS NOTES
Ricardo Johnson was informed of the inherent limitations of a virtual visit,  and has consented to a virtual therapy visit on 2022. Information regarding emergency contact information for this patient during this visit is to contact:  Kita Garcia at [phone] in addition to calling 911. The patient was informed that at any time during the virtual visit, they can decide to stop the virtual visit. The patient verified that they are physically located in the [facility] for this virtual visit. NUTRITION - FOLLOW-UP TREATMENT NOTE  Patient Name: Ricardo Johnson         Date: 2022  : 1970    YES Patient  Verified  Diagnosis: Z68.41 (ICD-10-CM) - BMI 40.0-44.9, adult ([facility] [address]   In time:   8:30am      Out time:9:14AM   Total Treatment Time (min): 44min     SUBJECTIVE/ASSESSMENT  Current Wt: 276  (home) Previous Wt: 278.6  (home) Wt Change: -2.6     Initial Wt: 279.2 (office) Total Wt change: -2.8 Height: 68     Changes in medication or medical history? Any new allergies, surgeries or procedures? No If yes, update Summary List        Nutrition Diagnosis        Diagnosis Status: limited adherence to nutrition related recommendations R/T lack of confidence in ability to change AEB pt report of confusion over healthy foods secondary to dieting history, resuming previous eating patterns with unintended weight gain of 3#.   [x]  Improved []  No Change    []  Declined   []  Discontinued       undesirable food choices R/T culure of overeating and history of [employer] Plate Club AEB pt report of eating chips and sweets for sole purpose to not waste it. [x]  Improved []  No Change    []  Declined   [x]  Discontinued     Physical inactivity R/T low motivation and low energy secondary to COVID illness AEB report of no exercise plan and lack of motivation some days  [x]  Improved []  No Change    []  Declined   []  Discontinued          Nutrition Monitoring and Evaluation: On vacation for 2+ weeks.  Proud to have not gained weight over that time. Mindful of food choices. Able to enjoy typical snack foods in moderation. Partaking of less \"junk food\" than would have typically on vacation. Avoiding fried foods. Ordered Hungry Root since being back to have snacks on hand. Tried plant based protein and liked it. Using chopped bag salads for lunches. Noticing when eating more ice cream she was having GI issues. No issues with yogurt. Does not currently drink milk. Typical dairy choices are cheese, yogurt, sour cream (small amounts). No risk of food boredom. Start off tracking on MyFitness Pal in the mornings. Still making better choices when not tracking. Candis Alosa faster and stopping when feeling full. Planning for party eating. Upcoming vacation in August. Plans to pack food similarly to how they did for this past vacation. Knee pain limiting exercise. Thinking about using the stationary bike. More active with walking on vacation but now having pain in knee. Can still do leg exercises     Previous goals:   Pain limitations. -bike at home 30min per day (10 min at a time). Wear supportive shoes and walk 3 times per week  met-prep lunches and snacks on sundays  Intermittent. -track on Noom/MyFitnessPal daily. Log dinner. Measuring out portions. met-box up veggies for snacks- 1 veggie snack per day  Met. -use water instead of coconut water in smoothie recipes instead of pineapple juice     Nutrition Prescription and  Intervention continue Self monitoring with Noom/MyFitness Pal  Exercise goals -continue to walking or using bike at home  Preparing snacks ahead of time to improve consistency of choosing vegetables for snacks. CBT   Educated on vaction eating. Provided support for changes made.          Patient Education:  [x]  Review current plan with patient   []  Other:    Handouts/  Information Provided: []  Carbohydrates  []  Protein  []  Fiber  []  Serving Sizes  []  Fluids  []  General guidelines []  Diabetes  []  Cholesterol  []  Sodium  []  SBGM  []  Food Journals  []  Others:      Patient Goals -try bike at home. Check for pain. If able 30min per day (10 min at a time). alternate exercises daily (stretching, leg raises, arms) - 10 min daily.   -continue prep lunches and snacks on sundays and having veggies for snacks.   -track on Noom/MyFitnessPal daily. Log dinner. Measuring out portions. Track before or as eating.    -continue being minful on vacation. PLAN  [x]  Continue on current plan []  Follow-up PRN   []  Discharge due to :    [x]  Next appt: 3 weeks     Dietitian: Dirk Hinkle MS, RD, CSSD    Date: 7/8/2022 Time: 9:15am     Claudene Mast is a 47 y.o. female being evaluated by a Virtual Visit (video visit) encounter to address concerns as mentioned above. A caregiver was present when appropriate. Due to this being a TeleHealth encounter (During [facility]- public health emergency), evaluation of the following areas was limited: Nutrition Focused Physical Exam. Pursuant to the emergency declaration under the [facility], [address] [place] [address] authority and the [employer] General Act, this Virtual Visit was conducted with patient's (and/or legal guardian's) consent, to reduce the risk of exposure to COVID-19 and provide necessary medical care. Services were provided through a video synchronous discussion virtually to substitute for in-person encounter. --Dre Lane on 7/8/2022 at 9:15am    An electronic signature was used to authenticate this note.

## 2022-08-17 RX ORDER — ESCITALOPRAM OXALATE 10 MG/1
10 TABLET ORAL DAILY
Qty: 90 TABLET | Refills: 3 | Status: SHIPPED | OUTPATIENT
Start: 2022-08-17 | End: 2022-09-13

## 2022-08-17 NOTE — TELEPHONE ENCOUNTER
Requested Prescriptions     Pending Prescriptions Disp Refills    escitalopram oxalate (LEXAPRO) 10 mg tablet 90 Tablet 3     Sig: Take 1 Tablet by mouth daily. RX refill request from the patient/pharmacy. Patient last seen 5/20/22 with labs, and next appt. scheduled for no future appointment.

## 2022-08-24 ENCOUNTER — HOSPITAL ENCOUNTER (OUTPATIENT)
Dept: NUTRITION | Age: 52
Discharge: HOME | End: 2022-08-24
Payer: COMMERCIAL

## 2022-08-24 PROCEDURE — 97803 MED NUTRITION INDIV SUBSEQ: CPT | Performed by: DIETITIAN, REGISTERED

## 2022-08-24 NOTE — PROGRESS NOTES
Archie Cobian was informed of the inherent limitations of a virtual visit,  and has consented to a virtual therapy visit on 2022. Information regarding emergency contact information for this patient during this visit is to contact:  Nadine Avendaño at [phone] in addition to calling 911. The patient was informed that at any time during the virtual visit, they can decide to stop the virtual visit. The patient verified that they are physically located in the [facility] for this virtual visit. NUTRITION - FOLLOW-UP TREATMENT NOTE  Patient Name: Archie Cobian         Date: 2022  : 1970    YES Patient  Verified  Diagnosis: Z68.41 (ICD-10-CM) - BMI 40.0-44.9, adult ([facility] [address]   In time:   8:30am      Out time:9:00AM   Total Treatment Time (min): 30 min     SUBJECTIVE/ASSESSMENT  Current Wt: 276  (home) Previous Wt: 274.6  (home) Wt Change: +1.4     Initial Wt: 279.2 (office) Total Wt change: -3.2 Height: 68     Changes in medication or medical history? Any new allergies, surgeries or procedures? No If yes, update Summary List        Nutrition Diagnosis        Diagnosis Status: limited adherence to nutrition related recommendations R/T lack of confidence in ability to change AEB pt report of confusion over healthy foods secondary to dieting history, resuming previous eating patterns with unintended weight gain of 3#.   [x]  Improved []  No Change    []  Declined   []  Discontinued       Physical inactivity R/T low motivation and low energy secondary to COVID illness AEB report of no exercise plan and lack of motivation some days  [x]  Improved []  No Change    []  Declined   []  Discontinued          Nutrition Monitoring and Evaluation: Vacation for 2 weeks. Knees bothering her. Did fine until last day of last day of vacation. Notes popping in her knees. Riding the bike but has do do low. More walking and kayaking and swimming while on vacation. More alcohol while on vacation.    Less \"junk\" while on vacation. Minimal eating out. Carried these habits back home. Best transition from vacation to back home. Back to making lunches and meal plan and prep. Camping trips for weekends in Sept and October. Plans to restart tracking today. Discussing upcoming holiday season. Baking around the holiday times. Stocking candy. No concerns for halloween. More issues around Thanksgiving. Options: bringing a dish to share (veggie tray or salad), smaller portions of foods, turkey over ham, no second servings, no alcohol at holiday meals. Plans for exercise when weather changes. Previous goals:   Met. Going slow due to pain. -clear off bike today. try bike at home. Check for pain. If able 30min per day (10 min at a time). alternate exercises daily (stretching, leg raises, arms) - 10 min daily. Met. -continue prep lunches and snacks on sundays and having veggies for snacks. Not met. -track on Noom/[employer] daily. Log dinner. Measuring out portions. Track before or as eating. Met. -continue being minful on vacation. Met .-purchase steamers to have a vegetable at dinner 5 nights per week. Nutrition Prescription and  Intervention continue Self monitoring with Noom/[employer] Pal  Exercise goals -continue to walking or using bike at home as able. Seeing doctor about pain this week  Preparing snacks ahead of time to improve consistency of choosing vegetables for snacks. CBT   Reviewed plan for holiday eating. Patient Education:  [x]  Review current plan with patient   []  Other:    Handouts/  Information Provided: []  Carbohydrates  []  Protein  []  Fiber  []  Serving Sizes  []  Fluids  []  General guidelines []  Diabetes  []  Cholesterol  []  Sodium  []  SBGM  []  Food Journals  []  Others:      Patient Goals -clear off bike today. try bike at home. Check for pain. If able 30min per day (10 min at a time).  alternate exercises daily (stretching, leg raises, arms) - 10 min daily.   -continue prep lunches and snacks on sundays and having veggies for snacks.   -track on Noom/MyFitnessPal daily. Log dinner. Measuring out portions. Track before or as eating.    -vegetable at dinner 5 nights per week.   -planning ahead for thanksgiving. Come up with 2 solid plans for thanksgiving time compared to past.      PLAN  [x]  Continue on current plan []  Follow-up PRN   []  Discharge due to :    [x]  Next appt: 4 weeks     Dietitian: Jenifer Amaral MS, RD, CSSD    Date: 8/24/2022 Time: 8:30am     Jessie Cisneros is a 47 y.o. female being evaluated by a Virtual Visit (video visit) encounter to address concerns as mentioned above. A caregiver was present when appropriate. Due to this being a TeleHealth encounter (During TDT-20 public health emergency), evaluation of the following areas was limited: Nutrition Focused Physical Exam. Pursuant to the emergency declaration under the [address] [place] [address] authority and the [employer] Act, this Virtual Visit was conducted with patient's (and/or legal guardian's) consent, to reduce the risk of exposure to COVID-19 and provide necessary medical care. Services were provided through a video synchronous discussion virtually to substitute for in-person encounter. --Jerardo Garcia on 8/24/2022 at 8:30am    An electronic signature was used to authenticate this note.

## 2022-09-12 ENCOUNTER — HOSPITAL ENCOUNTER (OUTPATIENT)
Dept: NUTRITION | Age: 52
Discharge: HOME | End: 2022-09-12
Payer: COMMERCIAL

## 2022-09-12 PROCEDURE — 97803 MED NUTRITION INDIV SUBSEQ: CPT | Performed by: DIETITIAN, REGISTERED

## 2022-09-12 NOTE — PROGRESS NOTES
Andrés Ponce was informed of the inherent limitations of a virtual visit,  and has consented to a virtual therapy visit on 2022. Information regarding emergency contact information for this patient during this visit is to contact:  Yohannes Barrow at [phone] in addition to calling 911. The patient was informed that at any time during the virtual visit, they can decide to stop the virtual visit. The patient verified that they are physically located in the [facility] for this virtual visit. NUTRITION - FOLLOW-UP TREATMENT NOTE  Patient Name: Andrés Ponce         Date: 2022  : 1970    YES Patient  Verified  Diagnosis: Z68.41 (ICD-10-CM) - BMI 40.0-44.9, adult ([facility] [address]   In time:  10:00am      Out time:10:30AM   Total Treatment Time (min): 30 min     SUBJECTIVE/ASSESSMENT  Current Wt: 274.4  (home) Previous Wt: 276  (home) Wt Change: -1.2     Initial Wt: 279.2 (office) Total Wt change: -4.8 Height: 68     Changes in medication or medical history? Any new allergies, surgeries or procedures? yes If yes, update Summary List   Sleep study results = WNL  Increased dose of Wellbutrin     Nutrition Diagnosis        Diagnosis Status: limited adherence to nutrition related recommendations R/T lack of confidence in ability to change AEB pt report of confusion over healthy foods secondary to dieting history, resuming previous eating patterns with unintended weight gain of 3#.   [x]  Improved []  No Change    []  Declined   []  Discontinued       Physical inactivity R/T low motivation and low energy secondary to COVID illness AEB report of no exercise plan and lack of motivation some days  [x]  Improved []  No Change    []  Declined   []  Discontinued          Nutrition Monitoring and Evaluation: New PCP. Sleep study results normal.   Increased Wellbutrin dose. Full physical and blood panel expected in few weeks. Discussed raising head of bed. Knee pain continues.  Camping this weekend. Now using stationary bike at work and at home consistently. Under desk bike and standing desk for at work. Still seeing chiropractor. Salad bag for lunch. Reviewed protein. Discussed dressing amount being used. More salads at dinner, more tomatoes, steamer bags. No expected barriers. Difficulty tracking. Planning for thanksgiving: take a small bite of food to taste it but not have to have a helping. Has not looked at a salad to bring but plans to bring some sort of vegetables. Previous goals:   Met. -clear off bike today. try bike at home. Check for pain. If able 30min per day (10 min at a time). alternate exercises daily (stretching, leg raises, arms) - 10 min daily. Met. Salad bags for lunch most often. Most often no added chicken. -continue prep lunches and snacks on sundays and having veggies for snacks. Not met. -track on Noom/MyFitnessPal daily. Log dinner. Measuring out portions. Track before or as eating. Met. -vegetable at dinner 5 nights per week.   -planning ahead for thanksgiving. Come up with 2 solid plans for thanksgiving time compared to past.      Nutrition Prescription and  Intervention discontinue Self monitoring with Noom/MyFitness Pal as pt is not currently using or making dietary changes based on logs. Revised goals around self monitoring to reading labels prior to purchasing or eating packaged foods and listening to hunger/fullness cues. Exercise goals -continue to walking or using bike at home as able. Seeing doctor about pain this week  Preparing snacks ahead of time to improve consistency of choosing vegetables for snacks. CBT   Reviewed plan for holiday eating.          Patient Education:  [x]  Review current plan with patient   []  Other:    Handouts/  Information Provided: []  Carbohydrates  []  Protein  []  Fiber  []  Serving Sizes  []  Fluids  []  General guidelines []  Diabetes  []  Cholesterol  []  Sodium  []  SBGM  []  Food Journals  [] Others:      Patient Goals -continue exercise 10 min daily. Biking or walking or stretching.   -continue prep lunches and snacks on sundays and having veggies for snacks.   -look at nutrition facts of food before eating it. Be mindful of hunger fullness. Don't be trash can.    -vegetable at dinner 5 nights per week.   -planning ahead for thanksgiving. Come up with 2 solid plans for thanksgiving time compared to past.      PLAN  [x]  Continue on current plan []  Follow-up PRN   []  Discharge due to :    [x]  Next appt: 4 weeks     Dietitian: Mervin Saravia MS, RD, CSSD    Date: 9/12/2022 Time: 10:00am     Loco Harrison is a 47 y.o. female being evaluated by a Virtual Visit (video visit) encounter to address concerns as mentioned above. A caregiver was present when appropriate. Due to this being a TeleHealth encounter (During [place] [address] public health emergency), evaluation of the following areas was limited: Nutrition Focused Physical Exam. Pursuant to the emergency declaration under the [facility], [address] [place] [address] authority and the [employer] General Act, this Virtual Visit was conducted with patient's (and/or legal guardian's) consent, to reduce the risk of exposure to COVID-19 and provide necessary medical care. Services were provided through a video synchronous discussion virtually to substitute for in-person encounter. --Ermias Villalobos on 9/12/2022 at 10:00am    An electronic signature was used to authenticate this note.

## 2022-10-10 ENCOUNTER — HOSPITAL ENCOUNTER (OUTPATIENT)
Dept: NUTRITION | Age: 52
Discharge: HOME | End: 2022-10-10
Payer: COMMERCIAL

## 2022-10-10 PROCEDURE — 97803 MED NUTRITION INDIV SUBSEQ: CPT | Performed by: DIETITIAN, REGISTERED

## 2022-10-10 NOTE — PROGRESS NOTES
Breanne Gutiérrez was informed of the inherent limitations of a virtual visit,  and has consented to a virtual therapy visit on 10/10/2022. Information regarding emergency contact information for this patient during this visit is to contact:  Dieudonne Cummings at [phone] in addition to calling 911. The patient was informed that at any time during the virtual visit, they can decide to stop the virtual visit. The patient verified that they are physically located in the [facility] for this virtual visit. NUTRITION - FOLLOW-UP TREATMENT NOTE  Patient Name: Breanne Gutiérrez         Date: 10/10/2022  : 1970    YES Patient  Verified  Diagnosis: Z68.41 (ICD-10-CM) - BMI 40.0-44.9, adult ([facility] [address]   In time:  10:00am      Out time:10:30AM   Total Treatment Time (min): 30 min     SUBJECTIVE/ASSESSMENT  Current Wt: 273.2  (home) Previous Wt: 274.4  (home) Wt Change: -1.2     Initial Wt: 279.2 (office) Total Wt change: -6 Height: 68     Changes in medication or medical history? Any new allergies, surgeries or procedures? yes If yes, update Summary List   \started on [place]. [place] nauseated initially. Better now. Eating smaller amounts over the day. Changed Welbutrin to 200mg instead of 150mg. Feeling better overall. More active. New labs.    5.8% A1C (up rom 5.6% last year) ]  Lipids all WNL  LIver function WNL  Kidney function WNL  Fasting GLucose 91mg/dl       Nutrition Diagnosis        Diagnosis Status: limited adherence to nutrition related recommendations R/T lack of confidence in ability to change AEB pt report of confusion over healthy foods secondary to dieting history, resuming previous eating patterns with unintended weight gain of 3#.   [x]  Improved []  No Change    []  Declined   []  Discontinued       Physical inactivity R/T low motivation and low energy secondary to COVID illness AEB report of no exercise plan and lack of motivation some days  [x]  Improved []  No Change    []  Declined   [] Discontinued          Nutrition Monitoring and Evaluation: Since starting medication she is less hungry and getting church faster. Trying to make better food choices. Stuffed after 1 slice of pizza compared to 3 slices of pizza. Few conferences in past few weeks. More active in general. Making better choices at buffets. Smaller portions. Less cravings for certain foods. No skipping meals. Having healthier choices when offered. Less consistent before. Notes feeling less swollen and less knee pain since starting medication. Less consistent with bowel movement since starting medication. Last Bowel movement Friday. Knee feeling better especially in morning. Parking farther. Due to fullness no longer able to eat a full bag of salad. Eating more vegetables for snacks. No longer finishing peoples foods. Water intake continued. Some dry mouth. No concerns for thanksgiving. Planning on bring a salad. Previous goals:   Met. Getting outside more. 3 days per week in office walking around and using the standing desk. Parking farther. -continue exercise 10 min daily. Biking or walking or stretching. Met. -continue prep lunches and snacks on sundays and having veggies for snacks. Met. -look at nutrition facts of food before eating it. Be mindful of hunger fullness. Don't be trash can. Met. -vegetable at dinner 5 nights per week. Met. -planning ahead for thanksgiving. Come up with 2 solid plans for thanksgiving time compared to past.      Nutrition Prescription and  Intervention  goals around self monitoring to reading labels prior to purchasing or eating packaged foods and listening to hunger/fullness cues. Exercise goals -continue to walking or using bike at home as able. Preparing snacks ahead of time to improve consistency of choosing vegetables for snacks. CBT   Reviewed plan for holiday eating.          Patient Education:  [x]  Review current plan with patient   []  Other: Handouts/  Information Provided: []  Carbohydrates  []  Protein  []  Fiber  []  Serving Sizes  []  Fluids  []  General guidelines []  Diabetes  []  Cholesterol  []  Sodium  []  SBGM  []  Food Journals  []  Others:      Patient Goals -continue exercise 10 min daily. Biking or walking or stretching.   -continue prep lunches and snacks on sundays and having veggies for snacks.   -look at nutrition facts of food before eating it. Be mindful of hunger fullness. Don't be trash can.    -vegetable at dinner 5 nights per week. PLAN  [x]  Continue on current plan []  Follow-up PRN   []  Discharge due to :    [x]  Next appt: 4 weeks     Dietitian: Jaswinder Sanchez MS, RD, CSSD    Date: 10/10/2022 Time: 10:00am     Ritika Ford is a 47 y.o. female being evaluated by a Virtual Visit (video visit) encounter to address concerns as mentioned above. A caregiver was present when appropriate. Due to this being a TeleHealth encounter (During [facility]-24 public health emergency), evaluation of the following areas was limited: Nutrition Focused Physical Exam. Pursuant to the emergency declaration under the [facility]note 2][facility], [address] [place] [address] authority and the [employer] General Act, this Virtual Visit was conducted with patient's (and/or legal guardian's) consent, to reduce the risk of exposure to COVID-19 and provide necessary medical care. Services were provided through a video synchronous discussion virtually to substitute for in-person encounter. --Bernard Serna on 10/10/2022 at 10:00am    An electronic signature was used to authenticate this note.

## 2022-12-05 ENCOUNTER — HOSPITAL ENCOUNTER (OUTPATIENT)
Dept: NUTRITION | Age: 52
Discharge: HOME | End: 2022-12-05
Payer: COMMERCIAL

## 2022-12-05 DIAGNOSIS — E66.01 CLASS 3 SEVERE OBESITY DUE TO EXCESS CALORIES IN ADULT, UNSPECIFIED BMI, UNSPECIFIED WHETHER SERIOUS COMORBIDITY PRESENT (HCC): ICD-10-CM

## 2022-12-05 DIAGNOSIS — Z71.3 DIETARY COUNSELING: Primary | ICD-10-CM

## 2022-12-05 PROCEDURE — 97803 MED NUTRITION INDIV SUBSEQ: CPT | Performed by: DIETITIAN, REGISTERED

## 2022-12-05 NOTE — PROGRESS NOTES
Ritu Bernal was informed of the inherent limitations of a virtual visit,  and has consented to a virtual therapy visit on 2022. Information regarding emergency contact information for this patient during this visit is to contact:  Vivienne Lyons at [phone] in addition to calling 911. The patient was informed that at any time during the virtual visit, they can decide to stop the virtual visit. The patient verified that they are physically located in the [facility] for this virtual visit. NUTRITION - FOLLOW-UP TREATMENT NOTE  Patient Name: Ritu Bernal         Date: 2022  : 1970    YES Patient  Verified  Diagnosis: Z68.41 (ICD-10-CM) - BMI 40.0-44.9, adult ([facility] [address] Z71.3   In time:  8:30am      Out time:9:00AM   Total Treatment Time (min): 30 min   -19.6  SUBJECTIVE/ASSESSMENT  Current Wt: 259.8  (home) Previous Wt: 273.2  (home) Wt Change: -13.4     Initial Wt: 279.2 (office) Total Wt change: -19.4 Height: 68     Changes in medication or medical history? Any new allergies, surgeries or procedures? yes If yes, update Summary List   started on [place]. [place] nauseated initially. Better now. Eating smaller amounts over the day. Started using Key Nutrients electrolyte powder 1 scoop per day (feels nauseated when drinkign it. Discussed stopping)     Nutrition Diagnosis        Diagnosis Status: New: Suspected inadequate energy intake R/T altered GI function and appetite secondary to medication AEB dietary recall and report of nausea/shaky feeling during the day when missing meals.      limited adherence to nutrition related recommendations R/T lack of confidence in ability to change AEB pt report of confusion over healthy foods secondary to dieting history, resuming previous eating patterns with unintended weight gain of 3#.   [x]  Improved []  No Change    []  Declined   []  Discontinued       Physical inactivity R/T low motivation and low energy secondary to COVID illness AEB report of no exercise plan and lack of motivation some days  [x]  Improved []  No Change    []  Declined   []  Discontinued          Nutrition Monitoring and Evaluation: Notes the Haig Font has helped with appetite and food cravings. Not thinking about food as often. Some symptoms of nausea and shaky when not eating as regularly. Low calorie intake. Estimated <1000 kcal based on recall. Difficulty remembering what she has eaten. Drinking water more often at work.  plate wife's food. Filling up faster. Hungry within a few hours. Some acid reflux type symptoms 2-3 hours after eating dinner. Reports it feels like hunger / empty stomach acid feeling. Not currently tracking intake. Forgetting if she has eaten. Trying to incorporate more fruits. Notes less vegetables than desired. B- eggs with cheese, 1 cinnamon bun (large)  S-   L- turkey salad sandwich at lunch. Maybe a protein shake. S- maybe 1 protein bar  D- 1 bowl of chili  S- some heart burn/nausea   Drinks: water. Next PCP visit in January. Previous goals:   Riding bike more and walking some. -continue exercise 10 min daily. Biking or walking or stretching. Packing soup and apple and protein shake and bar for at work. -continue prep lunches and snacks on sundays and having veggies for snacks.   -look at nutrition facts of food before eating it. Be mindful of hunger fullness. Don't be trash can. Not. -vegetable at dinner 5 nights per week. Nutrition Prescription and  Intervention  goals around self monitoring   Review of meal timing. Exercise goals -continue to walking or using bike at home as able. Preparing snacks ahead of time to improve consistency of choosing vegetables for snacks. CBT   Reviewed plan for holiday eating. Discussed electrolyte supplement and discontinuing use if causing nausea. Water recommendations focused on instead.          Patient Education:  [x]  Review current plan with patient   [] Other:    Handouts/  Information Provided: []  Carbohydrates  []  Protein  []  Fiber  []  Serving Sizes  []  Fluids  []  General guidelines []  Diabetes  []  Cholesterol  []  Sodium  []  SBGM  []  Food Journals  []  Others:      Patient Goals -continue exercise 10 min daily. Biking or walking or stretching.   -continue prep lunches and snacks on sundays and having veggies for snacks.   -vegetable at dinner 5 nights per week.   -eat something every 4 hours. -return to tracking food intake. PLAN  [x]  Continue on current plan []  Follow-up PRN   []  Discharge due to :    [x]  Next appt: 4 weeks     Dietitian: Teresa Moran MS, RD, CSSD    Date: 12/5/2022 Time: 10:00am     Saeed Solomon is a 47 y.o. female being evaluated by a Virtual Visit (video visit) encounter to address concerns as mentioned above. A caregiver was present when appropriate. Due to this being a TeleHealth encounter ([facility] evaluation of the following areas was limited: Nutrition Focused Physical Exam. Pursuant to the emergency declaration under the [address] [place] [address], [address] [place] [address] authority and the [employer] Act, this Virtual Visit was conducted with patient's (and/or legal guardian's) consent, to reduce the risk of exposure to COVID-19 and provide necessary medical care. Services were provided through a video synchronous discussion virtually to substitute for in-person encounter. --Maurice Stallworth on 12/5/2022 at 10:00am    An electronic signature was used to authenticate this note.

## 2023-01-04 ENCOUNTER — HOSPITAL ENCOUNTER (OUTPATIENT)
Dept: NUTRITION | Age: 53
Discharge: HOME | End: 2023-01-04
Payer: COMMERCIAL

## 2023-01-04 PROCEDURE — 97803 MED NUTRITION INDIV SUBSEQ: CPT | Performed by: DIETITIAN, REGISTERED

## 2023-01-04 NOTE — PROGRESS NOTES
Aaliyah Quintana was informed of the inherent limitations of a virtual visit,  and has consented to a virtual therapy visit on 2023. Information regarding emergency contact information for this patient during this visit is to contact:  Danielle Quijano at [phone] in addition to calling 911. The patient was informed that at any time during the virtual visit, they can decide to stop the virtual visit. The patient verified that they are physically located in the [facility] for this virtual visit. NUTRITION - FOLLOW-UP TREATMENT NOTE  Patient Name: Aaliyah Quintana         Date: 2023  : 1970    YES Patient  Verified  Diagnosis: Z68.41 (ICD-10-CM) - BMI 40.0-44.9, adult ([facility] [address] Z71.3   In time:  10:00am      Out time:10:30AM   Total Treatment Time (min): 30 min   -19.6  SUBJECTIVE/ASSESSMENT  Current Wt: 256.2  (home) Previous Wt: 259.8  (home) Wt Change: -3.6     Initial Wt: 279.2 (office) Total Wt change: -19.4 Height: 68     Changes in medication or medical history? Any new allergies, surgeries or procedures? yes If yes, update Summary List   Increased Monjaro dose. Had noted it was wearing off thoughout the week on lower dose. More snacking out of boredom and cravings. Nutrition Diagnosis        Diagnosis Status: New: Suspected inadequate energy intake R/T altered GI function and appetite secondary to medication AEB dietary recall and report of nausea/shaky feeling during the day when missing meals.    [x]  Improved []  No Change    []  Declined   []  Discontinued       limited adherence to nutrition related recommendations R/T lack of confidence in ability to change AEB pt report of confusion over healthy foods secondary to dieting history, resuming previous eating patterns with unintended weight gain of 3#.   [x]  Improved []  No Change    []  Declined   [x]  Discontinued       Physical inactivity R/T low motivation and low energy secondary to COVID illness AEB report of no exercise plan and lack of motivation some days  [x]  Improved []  No Change    []  Declined   []  Discontinued          Nutrition Monitoring and Evaluation: Notes the Deuce Baird has continued to help with appetite and food cravings. Not thinking about food as often. Starting higher dose today. Drinking water more often at work.  plate wife's food. Filling up faster. Hungry within a few hours. Some acid reflux type symptoms 2-3 hours after eating dinner. Reports it feels like hunger / empty stomach acid feeling. Not currently tracking intake. Forgetting if she has eaten. Trying to incorporate more fruits. Notes less vegetables than desired. B- protein shake (some days with banana)  S- protein shake  L- seafood salad (small portioned out. Didn't finish all) (today soup can healthy, humus and carrots)   S- banana, and venesin jerkey  D- pork loin with [place] and broccoli with cheese sauce and potatoes  S- none  Drinks: water    Not currently tracing on [employer]. 3 meals and 2 snacks. Tracked December 19th = 1100 kcal.   Wants to return to tracking. Previous goals:   Met. Using her bike -continue exercise 10 min daily. Biking or walking or stretching. Met. -continue prep lunches and snacks on sundays and having veggies for snacks. Met. -vegetable at dinner 5 nights per week. Met. -eat something every 4 hours. Not met. -return to tracking food intake. Nutrition Prescription and  Intervention  goals around self monitoring   Review of meal timing. Exercise goals -continue to walking or using bike at home as able. Preparing snacks ahead of time to improve consistency of choosing vegetables for snacks.    CBT           Patient Education:  [x]  Review current plan with patient   []  Other:    Handouts/  Information Provided: []  Carbohydrates  []  Protein  []  Fiber  []  Serving Sizes  []  Fluids  []  General guidelines []  Diabetes  []  Cholesterol  []  Sodium  [] SBGM  []  Food Journals  []  Others:      Patient Goals -continue exercise 10 min daily. Biking or walking or stretching.   -continue prep lunches and snacks on sundays and having veggies for snacks.   -vegetable at dinner 5 nights per week.   -eat something every 4 hours. -return to tracking food intake to increase general awareness. 5 days per week. PLAN  [x]  Continue on current plan []  Follow-up PRN   []  Discharge due to :    [x]  Next appt: 4 weeks     Dietitian: Jenifer Amaral MS, RD, CSSD    Date: 1/4/2023 Time: 10:00am     Jessie Cisneros is a 47 y.o. female being evaluated by a Virtual Visit (video visit) encounter to address concerns as mentioned above. A caregiver was present when appropriate. Due to this being a TeleHealth encounter (During [facility]-65 public health emergency), evaluation of the following areas was limited: Nutrition Focused Physical Exam. Pursuant to the emergency declaration under the [address] [place] [address] authority and the [employer] Act, this Virtual Visit was conducted with patient's (and/or legal guardian's) consent, to reduce the risk of exposure to COVID-19 and provide necessary medical care. Services were provided through a video synchronous discussion virtually to substitute for in-person encounter. --Jerardo Garcia on 1/4/2023 at 10:00am    An electronic signature was used to authenticate this note.

## 2023-05-24 RX ORDER — LEVOCETIRIZINE DIHYDROCHLORIDE 5 MG/1
TABLET, FILM COATED ORAL
COMMUNITY
Start: 2020-03-04

## 2023-05-24 RX ORDER — DROSPIRENONE AND ETHINYL ESTRADIOL 0.02-3(28)
KIT ORAL
COMMUNITY

## 2023-05-24 RX ORDER — FLUTICASONE PROPIONATE 50 MCG
2 SPRAY, SUSPENSION (ML) NASAL DAILY
COMMUNITY

## 2023-05-24 RX ORDER — ESCITALOPRAM OXALATE 10 MG/1
1 TABLET ORAL DAILY
COMMUNITY
Start: 2022-09-13

## 2023-10-26 NOTE — PATIENT INSTRUCTIONS
Depression and Chronic Disease: Care Instructions  Your Care Instructions    A chronic disease is one that you have for a long time. Some chronic diseases can be controlled, but they usually cannot be cured. Depression is common in people with chronic diseases, but it often goes unnoticed. Many people have concerns about seeking treatment for a mental health problem. You may think it's a sign of weakness, or you don't want people to know about it. It's important to overcome these reasons for not seeking treatment. Treating depression or anxiety is good for your health. Follow-up care is a key part of your treatment and safety. Be sure to make and go to all appointments, and call your doctor if you are having problems. It's also a good idea to know your test results and keep a list of the medicines you take. How can you care for yourself at home? Watch for symptoms of depression  The symptoms of depression are often subtle at first. You may think they are caused by your disease rather than depression. Or you may think it is normal to be depressed when you have a chronic disease. If you are depressed you may:  · Feel sad or hopeless. · Feel guilty or worthless. · Not enjoy the things you used to enjoy. · Feel hopeless, as though life is not worth living. · Have trouble thinking or remembering. · Have low energy, and you may not eat or sleep well. · Pull away from others. · Think often about death or killing yourself. (Keep the numbers for these national suicide hotlines: 7-521-507-TALK [1-509.310.5992] and 9-768-TMARFZN [1-907.252.1278]. )  Get treatment  By treating your depression, you can feel more hopeful and have more energy. If you feel better, you may take better care of yourself, so your health may improve. · Talk to your doctor if you have any changes in mood during treatment for your disease. · Ask your doctor for help.  Counseling, antidepressant medicine, or a combination of the two can Rx sent help most people with depression. Often a combination works best. Counseling can also help you cope with having a chronic disease. When should you call for help? Call 911 anytime you think you may need emergency care. For example, call if:    · You feel like hurting yourself or someone else.     · Someone you know has depression and is about to attempt or is attempting suicide.   [facility] your doctor now or seek immediate medical care if:    · You hear voices.     · Someone you know has depression and:  ? Starts to give away his or her possessions. ? Uses illegal drugs or drinks alcohol heavily. ? Talks or writes about death, including writing suicide notes or talking about guns, knives, or pills. ? Starts to spend a lot of time alone. ? Acts very aggressively or suddenly appears calm.    Watch closely for changes in your health, and be sure to contact your doctor if:    · You do not get better as expected. Where can you learn more? Go to http://gaetano-delon.info/. Enter A620 in the search box to learn more about \"Depression and Chronic Disease: Care Instructions. \"  Current as of: December 7, 2017  Content Version: 11.8  © 7862-9627 [employer]. Care instructions adapted under license by [employer] (which disclaims liability or warranty for this information). If you have questions about a medical condition or this instruction, always ask your healthcare professional. [place] [address] any warranty or liability for your use of this information. Depression and Chronic Disease: Care Instructions  Your Care Instructions    A chronic disease is one that you have for a long time. Some chronic diseases can be controlled, but they usually cannot be cured. Depression is common in people with chronic diseases, but it often goes unnoticed. Many people have concerns about seeking treatment for a mental health problem.  You may think it's a sign of weakness, or you don't want people to know about it. It's important to overcome these reasons for not seeking treatment. Treating depression or anxiety is good for your health. Follow-up care is a key part of your treatment and safety. Be sure to make and go to all appointments, and call your doctor if you are having problems. It's also a good idea to know your test results and keep a list of the medicines you take. How can you care for yourself at home? Watch for symptoms of depression  The symptoms of depression are often subtle at first. You may think they are caused by your disease rather than depression. Or you may think it is normal to be depressed when you have a chronic disease. If you are depressed you may:  · Feel sad or hopeless. · Feel guilty or worthless. · Not enjoy the things you used to enjoy. · Feel hopeless, as though life is not worth living. · Have trouble thinking or remembering. · Have low energy, and you may not eat or sleep well. · Pull away from others. · Think often about death or killing yourself. (Keep the numbers for these national suicide hotlines: 3-883-557-TALK [1-323.142.1713] and 3-210-OWGAPAR [1-565.215.7366]. )  Get treatment  By treating your depression, you can feel more hopeful and have more energy. If you feel better, you may take better care of yourself, so your health may improve. · Talk to your doctor if you have any changes in mood during treatment for your disease. · Ask your doctor for help. Counseling, antidepressant medicine, or a combination of the two can help most people with depression. Often a combination works best. Counseling can also help you cope with having a chronic disease. When should you call for help? Call 911 anytime you think you may need emergency care.  For example, call if:    · You feel like hurting yourself or someone else.     · Someone you know has depression and is about to attempt or is attempting suicide.   [facility] your doctor now or seek immediate medical care if:    · You hear voices.     · Someone you know has depression and:  ? Starts to give away his or her possessions. ? Uses illegal drugs or drinks alcohol heavily. ? Talks or writes about death, including writing suicide notes or talking about guns, knives, or pills. ? Starts to spend a lot of time alone. ? Acts very aggressively or suddenly appears calm.    Watch closely for changes in your health, and be sure to contact your doctor if:    · You do not get better as expected. Where can you learn more? Go to http://gaetano-delon.info/. Enter C614 in the search box to learn more about \"Depression and Chronic Disease: Care Instructions. \"  Current as of: December 7, 2017  Content Version: 11.8  © 6658-6836 [employer]. Care instructions adapted under license by [employer] (which disclaims liability or warranty for this information). If you have questions about a medical condition or this instruction, always ask your healthcare professional. [address] any warranty or liability for your use of this information.